# Patient Record
Sex: FEMALE | Race: WHITE | Employment: PART TIME | URBAN - METROPOLITAN AREA
[De-identification: names, ages, dates, MRNs, and addresses within clinical notes are randomized per-mention and may not be internally consistent; named-entity substitution may affect disease eponyms.]

---

## 2017-02-06 ENCOUNTER — OFFICE VISIT (OUTPATIENT)
Dept: FAMILY MEDICINE CLINIC | Age: 37
End: 2017-02-06

## 2017-02-06 VITALS
WEIGHT: 168.6 LBS | HEART RATE: 72 BPM | BODY MASS INDEX: 27.1 KG/M2 | RESPIRATION RATE: 16 BRPM | SYSTOLIC BLOOD PRESSURE: 108 MMHG | TEMPERATURE: 98.7 F | DIASTOLIC BLOOD PRESSURE: 64 MMHG | HEIGHT: 66 IN

## 2017-02-06 DIAGNOSIS — R29.898 WEAKNESS OF RIGHT LOWER EXTREMITY: ICD-10-CM

## 2017-02-06 DIAGNOSIS — G89.29 CHRONIC BILATERAL LOW BACK PAIN WITHOUT SCIATICA: ICD-10-CM

## 2017-02-06 DIAGNOSIS — M54.50 CHRONIC BILATERAL LOW BACK PAIN WITHOUT SCIATICA: ICD-10-CM

## 2017-02-06 DIAGNOSIS — M54.16 LUMBAR RADICULOPATHY: Primary | ICD-10-CM

## 2017-02-06 PROCEDURE — 99213 OFFICE O/P EST LOW 20 MIN: CPT | Performed by: FAMILY MEDICINE

## 2017-02-06 RX ORDER — IBUPROFEN 200 MG
800 TABLET ORAL DAILY PRN
COMMUNITY
End: 2017-03-22 | Stop reason: ALTCHOICE

## 2017-02-06 RX ORDER — CYCLOBENZAPRINE HCL 10 MG
10 TABLET ORAL NIGHTLY PRN
Qty: 30 TABLET | Refills: 0 | Status: SHIPPED | OUTPATIENT
Start: 2017-02-06 | End: 2017-03-22 | Stop reason: SDUPTHER

## 2017-02-06 RX ORDER — IBUPROFEN 800 MG/1
800 TABLET ORAL EVERY 8 HOURS PRN
Qty: 60 TABLET | Refills: 1 | Status: SHIPPED | OUTPATIENT
Start: 2017-02-06

## 2017-02-23 ENCOUNTER — HOSPITAL ENCOUNTER (OUTPATIENT)
Dept: MRI IMAGING | Age: 37
Discharge: HOME | End: 2017-02-23
Payer: COMMERCIAL

## 2017-02-23 VITALS — WEIGHT: 166 LBS | BODY MASS INDEX: 27.2 KG/M2

## 2017-02-23 DIAGNOSIS — R29.898 WEAKNESS OF RIGHT LOWER EXTREMITY: ICD-10-CM

## 2017-02-23 DIAGNOSIS — M54.16 LUMBAR RADICULOPATHY: ICD-10-CM

## 2017-02-23 PROCEDURE — 72148 MRI LUMBAR SPINE W/O DYE: CPT

## 2017-03-22 ENCOUNTER — OFFICE VISIT (OUTPATIENT)
Dept: FAMILY MEDICINE CLINIC | Age: 37
End: 2017-03-22

## 2017-03-22 VITALS
WEIGHT: 170.5 LBS | HEIGHT: 66 IN | TEMPERATURE: 99 F | BODY MASS INDEX: 27.4 KG/M2 | HEART RATE: 76 BPM | DIASTOLIC BLOOD PRESSURE: 62 MMHG | SYSTOLIC BLOOD PRESSURE: 112 MMHG | RESPIRATION RATE: 14 BRPM

## 2017-03-22 DIAGNOSIS — G89.29 CHRONIC BILATERAL LOW BACK PAIN WITHOUT SCIATICA: Primary | ICD-10-CM

## 2017-03-22 DIAGNOSIS — F41.1 GENERALIZED ANXIETY DISORDER: ICD-10-CM

## 2017-03-22 DIAGNOSIS — M47.816 SPONDYLOSIS OF LUMBAR REGION WITHOUT MYELOPATHY OR RADICULOPATHY: ICD-10-CM

## 2017-03-22 DIAGNOSIS — M54.50 CHRONIC BILATERAL LOW BACK PAIN WITHOUT SCIATICA: Primary | ICD-10-CM

## 2017-03-22 PROCEDURE — 99213 OFFICE O/P EST LOW 20 MIN: CPT | Performed by: FAMILY MEDICINE

## 2017-03-22 RX ORDER — CYCLOBENZAPRINE HCL 10 MG
10 TABLET ORAL NIGHTLY PRN
Qty: 30 TABLET | Refills: 0 | Status: SHIPPED | OUTPATIENT
Start: 2017-03-22 | End: 2017-06-28 | Stop reason: SDUPTHER

## 2017-03-22 RX ORDER — CLONAZEPAM 0.5 MG/1
0.5 TABLET ORAL NIGHTLY PRN
Qty: 30 TABLET | Refills: 2 | Status: SHIPPED | OUTPATIENT
Start: 2017-03-22 | End: 2019-07-02

## 2017-03-27 PROBLEM — M47.817 LUMBOSACRAL SPONDYLOSIS WITHOUT MYELOPATHY: Status: ACTIVE | Noted: 2017-03-27

## 2017-03-27 PROBLEM — M51.9 ANNULAR TEAR: Status: ACTIVE | Noted: 2017-03-27

## 2017-04-18 PROBLEM — G89.29 CHRONIC BILATERAL THORACIC BACK PAIN: Status: ACTIVE | Noted: 2017-04-18

## 2017-04-18 PROBLEM — M54.6 CHRONIC BILATERAL THORACIC BACK PAIN: Status: ACTIVE | Noted: 2017-04-18

## 2019-07-02 ENCOUNTER — OFFICE VISIT (OUTPATIENT)
Dept: OBGYN CLINIC | Age: 39
End: 2019-07-02
Payer: COMMERCIAL

## 2019-07-02 VITALS
DIASTOLIC BLOOD PRESSURE: 74 MMHG | HEIGHT: 64 IN | WEIGHT: 182 LBS | BODY MASS INDEX: 31.07 KG/M2 | SYSTOLIC BLOOD PRESSURE: 116 MMHG

## 2019-07-02 DIAGNOSIS — Z11.51 SCREENING FOR HUMAN PAPILLOMAVIRUS: ICD-10-CM

## 2019-07-02 DIAGNOSIS — Z01.419 WOMEN'S ANNUAL ROUTINE GYNECOLOGICAL EXAMINATION: ICD-10-CM

## 2019-07-02 DIAGNOSIS — Z12.31 SCREENING MAMMOGRAM, ENCOUNTER FOR: ICD-10-CM

## 2019-07-02 DIAGNOSIS — Z01.419 WOMEN'S ANNUAL ROUTINE GYNECOLOGICAL EXAMINATION: Primary | ICD-10-CM

## 2019-07-02 PROCEDURE — 99396 PREV VISIT EST AGE 40-64: CPT | Performed by: OBSTETRICS & GYNECOLOGY

## 2019-07-02 RX ORDER — CHLORAL HYDRATE 500 MG
3000 CAPSULE ORAL 3 TIMES DAILY
COMMUNITY

## 2019-07-02 ASSESSMENT — ENCOUNTER SYMPTOMS
ALLERGIC/IMMUNOLOGIC NEGATIVE: 1
RECTAL PAIN: 0
BLOOD IN STOOL: 0
CONSTIPATION: 0
ABDOMINAL PAIN: 0
NAUSEA: 0
ANAL BLEEDING: 0
DIARRHEA: 0
VOMITING: 0
EYES NEGATIVE: 1
ABDOMINAL DISTENTION: 0
RESPIRATORY NEGATIVE: 1

## 2019-07-10 LAB
HPV COMMENT: NORMAL
HPV TYPE 16: NOT DETECTED
HPV TYPE 18: NOT DETECTED
HPVOH (OTHER TYPES): NOT DETECTED

## 2019-07-15 ENCOUNTER — HOSPITAL ENCOUNTER (OUTPATIENT)
Dept: WOMENS IMAGING | Age: 39
Discharge: HOME | End: 2019-07-17
Payer: COMMERCIAL

## 2019-07-15 DIAGNOSIS — Z12.31 SCREENING MAMMOGRAM, ENCOUNTER FOR: ICD-10-CM

## 2019-07-15 PROCEDURE — 77067 SCR MAMMO BI INCL CAD: CPT

## 2023-02-02 PROBLEM — J30.9 CHRONIC ALLERGIC RHINITIS: Status: ACTIVE | Noted: 2023-02-02

## 2023-02-02 PROBLEM — F51.04 CHRONIC INSOMNIA: Status: ACTIVE | Noted: 2023-02-02

## 2023-02-02 PROBLEM — R53.83 FATIGUE: Status: ACTIVE | Noted: 2023-02-02

## 2023-02-02 PROBLEM — F41.1 GENERALIZED ANXIETY DISORDER: Status: ACTIVE | Noted: 2023-02-02

## 2023-02-02 RX ORDER — ESCITALOPRAM OXALATE 10 MG/1
1 TABLET ORAL DAILY
COMMUNITY
Start: 2021-10-20 | End: 2023-03-21 | Stop reason: SDUPTHER

## 2023-02-02 RX ORDER — MELATONIN 5 MG
5 CAPSULE ORAL NIGHTLY
COMMUNITY
End: 2023-03-21 | Stop reason: SDUPTHER

## 2023-02-02 RX ORDER — LORATADINE AND PSEUDOEPHEDRINE SULFATE 5; 120 MG/1; MG/1
1 TABLET, EXTENDED RELEASE ORAL 2 TIMES DAILY
COMMUNITY
End: 2023-06-20 | Stop reason: SDUPTHER

## 2023-02-02 RX ORDER — FLUTICASONE PROPIONATE 50 MCG
2 SPRAY, SUSPENSION (ML) NASAL DAILY
COMMUNITY
Start: 2021-12-02 | End: 2023-06-20 | Stop reason: SDUPTHER

## 2023-02-02 RX ORDER — ALPRAZOLAM 0.5 MG/1
TABLET ORAL
COMMUNITY
Start: 2021-10-20 | End: 2023-03-21 | Stop reason: SDUPTHER

## 2023-03-15 ENCOUNTER — TELEPHONE (OUTPATIENT)
Dept: PRIMARY CARE | Facility: CLINIC | Age: 43
End: 2023-03-15
Payer: COMMERCIAL
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
## 2023-03-16 ENCOUNTER — APPOINTMENT (OUTPATIENT)
Dept: PRIMARY CARE | Facility: CLINIC | Age: 43
End: 2023-03-16
Payer: COMMERCIAL

## 2023-03-21 ENCOUNTER — OFFICE VISIT (OUTPATIENT)
Dept: PRIMARY CARE | Facility: CLINIC | Age: 43
End: 2023-03-21
Payer: COMMERCIAL

## 2023-03-21 VITALS
SYSTOLIC BLOOD PRESSURE: 124 MMHG | HEIGHT: 64 IN | RESPIRATION RATE: 16 BRPM | BODY MASS INDEX: 32.44 KG/M2 | TEMPERATURE: 97.2 F | DIASTOLIC BLOOD PRESSURE: 82 MMHG | WEIGHT: 190 LBS | HEART RATE: 72 BPM

## 2023-03-21 DIAGNOSIS — F41.1 GENERALIZED ANXIETY DISORDER: Primary | ICD-10-CM

## 2023-03-21 DIAGNOSIS — F51.04 CHRONIC INSOMNIA: ICD-10-CM

## 2023-03-21 PROCEDURE — 1036F TOBACCO NON-USER: CPT | Performed by: FAMILY MEDICINE

## 2023-03-21 PROCEDURE — 99213 OFFICE O/P EST LOW 20 MIN: CPT | Performed by: FAMILY MEDICINE

## 2023-03-21 RX ORDER — ALPRAZOLAM 0.5 MG/1
TABLET ORAL
Qty: 60 TABLET | Refills: 0 | Status: SHIPPED | OUTPATIENT
Start: 2023-03-21 | End: 2023-06-20 | Stop reason: SDUPTHER

## 2023-03-21 RX ORDER — MELATONIN 5 MG
5 CAPSULE ORAL NIGHTLY
Qty: 30 CAPSULE | Refills: 2 | Status: SHIPPED | OUTPATIENT
Start: 2023-03-21 | End: 2023-06-20 | Stop reason: SDUPTHER

## 2023-03-21 RX ORDER — ESCITALOPRAM OXALATE 10 MG/1
10 TABLET ORAL DAILY
Qty: 30 TABLET | Refills: 5 | Status: SHIPPED | OUTPATIENT
Start: 2023-03-21 | End: 2023-06-20 | Stop reason: SDUPTHER

## 2023-03-21 ASSESSMENT — PATIENT HEALTH QUESTIONNAIRE - PHQ9
SUM OF ALL RESPONSES TO PHQ9 QUESTIONS 1 AND 2: 0
2. FEELING DOWN, DEPRESSED OR HOPELESS: NOT AT ALL
1. LITTLE INTEREST OR PLEASURE IN DOING THINGS: NOT AT ALL

## 2023-03-21 ASSESSMENT — ANXIETY QUESTIONNAIRES
6. BECOMING EASILY ANNOYED OR IRRITABLE: SEVERAL DAYS
2. NOT BEING ABLE TO STOP OR CONTROL WORRYING: NOT AT ALL
4. TROUBLE RELAXING: NOT AT ALL
GAD7 TOTAL SCORE: 2
5. BEING SO RESTLESS THAT IT IS HARD TO SIT STILL: NOT AT ALL
3. WORRYING TOO MUCH ABOUT DIFFERENT THINGS: NOT AT ALL
IF YOU CHECKED OFF ANY PROBLEMS ON THIS QUESTIONNAIRE, HOW DIFFICULT HAVE THESE PROBLEMS MADE IT FOR YOU TO DO YOUR WORK, TAKE CARE OF THINGS AT HOME, OR GET ALONG WITH OTHER PEOPLE: SOMEWHAT DIFFICULT
1. FEELING NERVOUS, ANXIOUS, OR ON EDGE: SEVERAL DAYS
7. FEELING AFRAID AS IF SOMETHING AWFUL MIGHT HAPPEN: NOT AT ALL

## 2023-03-21 NOTE — PROGRESS NOTES
Jennifer South is a 43 y.o. female who presents for Follow up on Anxiety Disorder.    Anxiety  Presents for follow-up visit. Symptoms include insomnia and nervous/anxious behavior. Patient reports no chest pain, compulsions, confusion, decreased concentration, depressed mood, dizziness, dry mouth, excessive worry, feeling of choking, hyperventilation, impotence, irritability, malaise, muscle tension, nausea, obsessions, palpitations, panic, restlessness, shortness of breath or suicidal ideas. Symptoms occur occasionally. The most recent episode lasted 2 minutes. The severity of symptoms is mild. The patient sleeps 6 hours per night. The quality of sleep is fair. Nighttime awakenings: occasional.     OARRS:  Teja Stevens MD on 3/21/2023  2:27 PM  I have personally reviewed the OARRS report for Jennifer South. I have considered the risks of abuse, dependence, addiction and diversion and I believe that it is clinically appropriate for Jennifer South to be prescribed this medication    Is the patient prescribed a combination of a benzodiazepine and opioid?  No    Last Urine Drug Screen / ordered today: No  Recent Results (from the past 07826 hour(s))   OPIATE/OPIOID/BENZO PRESCRIPTION COMPLIANCE    Collection Time: 10/20/21  1:31 PM   Result Value Ref Range    DRUG SCREEN COMMENT URINE SEE BELOW     Creatine, Urine 91.8 mg/dL    Amphetamine Screen, Urine PRESUMPTIVE NEGATIVE NEGATIVE    Barbiturate Screen, Urine PRESUMPTIVE NEGATIVE NEGATIVE    Cannabinoid Screen, Urine PRESUMPTIVE NEGATIVE NEGATIVE    Cocaine Screen, Urine PRESUMPTIVE NEGATIVE NEGATIVE    PCP Screen, Urine PRESUMPTIVE NEGATIVE NEGATIVE    7-Aminoclonazepam <25 Cutoff <25 ng/mL    Alpha-Hydroxyalprazolam 49 (A) Cutoff <25 ng/mL    Alpha-Hydroxymidazolam <25 Cutoff <25 ng/mL    Alprazolam 51 (A) Cutoff <25 ng/mL    Chlordiazepoxide <25 Cutoff <25 ng/mL    Clonazepam <25 Cutoff <25 ng/mL    Diazepam <25 Cutoff <25 ng/mL    Lorazepam <25 Cutoff  <25 ng/mL    Midazolam <25 Cutoff <25 ng/mL    Nordiazepam <25 Cutoff <25 ng/mL    Oxazepam <25 Cutoff <25 ng/mL    Temazepam <25 Cutoff <25 ng/mL    Zolpidem <25 Cutoff <25 ng/mL    Zolpidem Metabolite (ZCA) <25 Cutoff <25 ng/mL    6-Acetylmorphine <25 Cutoff <25 ng/mL    Codeine <50 Cutoff <50 ng/mL    Hydrocodone <25 Cutoff <25 ng/mL    Hydromorphone <25 Cutoff <25 ng/mL    Morphine Urine <50 Cutoff <50 ng/mL    Norhydrocodone <25 Cutoff <25 ng/mL    Noroxycodone <25 Cutoff <25 ng/mL    Oxycodone <25 Cutoff <25 ng/mL    Oxymorphone <25 Cutoff <25 ng/mL    Tramadol <50 Cutoff <50 ng/mL    O-Desmethyltramadol <50 Cutoff <50 ng/mL    Fentanyl <2.5 Cutoff<2.5 ng/mL    Norfentanyl <2.5 Cutoff<2.5 ng/mL    METHADONE CONFIRMATION,URINE <25 Cutoff <25 ng/mL    EDDP <25 Cutoff <25 ng/mL     Results are as expected.     Controlled Substance Agreement:  Date of the Last Agreement: 2023  Reviewed Controlled Substance Agreement including but not limited to the benefits, risks, and alternatives to treatment with a Controlled Substance medication(s).    Benzodiazepines:  What is the patient's goal of therapy? Able to function without being unduly anxious and carry out day to day activities without tension and panic attacks.    Is this being achieved with current treatment? Yes    NITA-7:  Over the last 2 weeks, how often have you been bothered by any of the following problems?  Feeling nervous, anxious, or on edge: 1  Not being able to stop or control worryin  Worrying too much about different things: 0  Trouble relaxin  Being so restless that it is hard to sit still: 0  Becoming easily annoyed or irritable: 1  Feeling afraid as if something awful might happen: 0  NITA-7 Total Score: 2      Activities of Daily Living:   Is your overall impression that this patient is benefiting (symptom reduction outweighs side effects) from benzodiazepine therapy? Yes     1. Physical Functioning: Same  2. Family Relationship:  Same  3. Social Relationship: Same  4. Mood: Same  5. Sleep Patterns: Same  6. Overall Function: Same    Past Medical History:   Diagnosis Date    Other seasonal allergic rhinitis     Seasonal allergies     Patient Active Problem List    Diagnosis Date Noted    Chronic allergic rhinitis 02/02/2023    Fatigue 02/02/2023    Generalized anxiety disorder 02/02/2023    Chronic insomnia 02/02/2023     History reviewed. No pertinent surgical history.    Social History     Socioeconomic History    Marital status:      Spouse name: None    Number of children: None    Years of education: None    Highest education level: None   Occupational History    None   Tobacco Use    Smoking status: Never    Smokeless tobacco: Never   Vaping Use    Vaping status: None   Substance and Sexual Activity    Alcohol use: None    Drug use: None    Sexual activity: None   Other Topics Concern    None   Social History Narrative    None     Social Determinants of Health     Financial Resource Strain: Not on file   Food Insecurity: Not on file   Transportation Needs: Not on file   Physical Activity: Not on file   Stress: Not on file   Social Connections: Not on file   Intimate Partner Violence: Not on file   Housing Stability: Not on file     Current Outpatient Medications   Medication Sig Dispense Refill    fluticasone (Flonase) 50 mcg/actuation nasal spray Administer 2 sprays into affected nostril(s) once daily.      loratadine-pseudoephedrine (Claritin-D 12 Hour) 5-120 mg 12 hr tablet Take 1 tablet by mouth in the morning and 1 tablet before bedtime.      ALPRAZolam (Xanax) 0.5 mg tablet TAKE 1/2 TO 1 TABLET TWICE DAILY AS NEEDED. 60 tablet 0    escitalopram (Lexapro) 10 mg tablet Take 1 tablet (10 mg) by mouth once daily. 30 tablet 5    melatonin 5 mg capsule Take 5 mg by mouth once daily at bedtime. 30 capsule 2     No current facility-administered medications for this visit.     Current Outpatient Medications on File Prior to Visit  "  Medication Sig Dispense Refill    fluticasone (Flonase) 50 mcg/actuation nasal spray Administer 2 sprays into affected nostril(s) once daily.      loratadine-pseudoephedrine (Claritin-D 12 Hour) 5-120 mg 12 hr tablet Take 1 tablet by mouth in the morning and 1 tablet before bedtime.      [DISCONTINUED] ALPRAZolam (Xanax) 0.5 mg tablet TAKE 1/2 TO 1 TABLET TWICE DAILY AS NEEDED.      [DISCONTINUED] escitalopram (Lexapro) 10 mg tablet Take 1 tablet (10 mg) by mouth once daily.      [DISCONTINUED] melatonin 5 mg capsule Take 5 mg by mouth at bedtime.       No current facility-administered medications on file prior to visit.     No Known Allergies    Review of Systems - General ROS: negative for - fatigue, fever, malaise, night sweats, sleep disturbance or weight loss  ENT ROS: negative for - hearing change, nasal discharge, oral lesions, sinus pain, sore throat, tinnitus or vertigo  Endocrine ROS: negative for - hot flashes, malaise/lethargy, palpitations, polydipsia/polyuria'  Respiratory ROS: negative for - cough, hemoptysis, pleuritic pain, shortness of breath or wheezing  Cardiovascular ROS: no chest pain or dyspnea on exertion  Gastrointestinal ROS: no abdominal pain, change in bowel habits, or black or bloody stools  Genito-Urinary ROS: no dysuria, trouble voiding, or hematuria  Neurological ROS: negative for - dizziness, gait disturbance, headaches, impaired coordination/balance,   No numbness/tingling, tremors or visual changes    Blood pressure 124/82, pulse 72, temperature 36.2 °C (97.2 °F), resp. rate 16, height 1.626 m (5' 4\"), weight 86.2 kg (190 lb).    Physical Examination: General appearance - alert, well appearing, and in no distress  Mental status - alert, oriented to person, place, and time  Eyes - pupils equal and reactive, extraocular eye movements intact  Ears - bilateral TM's and external ear canals normal  Mouth - mucous membranes moist, pharynx normal without lesions  Neck - supple, no " significant adenopathy  Chest - clear to auscultation, no wheezes, rales or rhonchi, symmetric air entry  Heart - normal rate, regular rhythm, normal S1, S2, no murmurs, rubs, clicks or gallops  Abdomen - soft, nontender, nondistended, no masses or organomegaly  Extremities -no cyanosis clubbing or edema  Neurological - alert, oriented, normal speech, no focal findings or movement disorder noted    Problem List Items Addressed This Visit       Chronic insomnia    Relevant Medications    melatonin 5 mg capsule    Other Relevant Orders    Follow Up In [facility] - PCP    Generalized anxiety disorder - Primary    Relevant Medications    ALPRAZolam (Xanax) 0.5 mg tablet    escitalopram (Lexapro) 10 mg tablet    Other Relevant Orders    Follow Up In [facility] - PCP     Scribe Attestation  By signing my name below, I, Shady Holbrook, Scribe   attest that this documentation has been prepared under the direction and in the presence of Teja Stevens MD.

## 2023-06-20 ENCOUNTER — OFFICE VISIT (OUTPATIENT)
Dept: PRIMARY CARE | Facility: CLINIC | Age: 43
End: 2023-06-20
Payer: COMMERCIAL

## 2023-06-20 VITALS
SYSTOLIC BLOOD PRESSURE: 126 MMHG | RESPIRATION RATE: 18 BRPM | HEIGHT: 64 IN | WEIGHT: 197.8 LBS | OXYGEN SATURATION: 98 % | DIASTOLIC BLOOD PRESSURE: 64 MMHG | BODY MASS INDEX: 33.77 KG/M2 | HEART RATE: 85 BPM | TEMPERATURE: 97.8 F

## 2023-06-20 DIAGNOSIS — F41.1 GENERALIZED ANXIETY DISORDER: ICD-10-CM

## 2023-06-20 DIAGNOSIS — F51.04 CHRONIC INSOMNIA: ICD-10-CM

## 2023-06-20 DIAGNOSIS — J30.9 CHRONIC ALLERGIC RHINITIS: Primary | ICD-10-CM

## 2023-06-20 PROCEDURE — 1036F TOBACCO NON-USER: CPT | Performed by: FAMILY MEDICINE

## 2023-06-20 PROCEDURE — 99214 OFFICE O/P EST MOD 30 MIN: CPT | Performed by: FAMILY MEDICINE

## 2023-06-20 RX ORDER — MELATONIN 5 MG
5 CAPSULE ORAL NIGHTLY
Qty: 30 CAPSULE | Refills: 2 | Status: SHIPPED | OUTPATIENT
Start: 2023-06-20 | End: 2023-10-31 | Stop reason: SDUPTHER

## 2023-06-20 RX ORDER — FLUTICASONE PROPIONATE 50 MCG
2 SPRAY, SUSPENSION (ML) NASAL DAILY
Qty: 48 G | Refills: 1 | Status: SHIPPED | OUTPATIENT
Start: 2023-06-20 | End: 2023-10-31 | Stop reason: SDUPTHER

## 2023-06-20 RX ORDER — LORATADINE AND PSEUDOEPHEDRINE SULFATE 5; 120 MG/1; MG/1
1 TABLET, EXTENDED RELEASE ORAL 2 TIMES DAILY
Qty: 60 TABLET | Refills: 2 | Status: SHIPPED | OUTPATIENT
Start: 2023-06-20 | End: 2023-10-31 | Stop reason: SDUPTHER

## 2023-06-20 RX ORDER — ALPRAZOLAM 0.5 MG/1
TABLET ORAL
Qty: 60 TABLET | Refills: 0 | Status: SHIPPED | OUTPATIENT
Start: 2023-06-20 | End: 2023-10-31 | Stop reason: SDUPTHER

## 2023-06-20 RX ORDER — ESCITALOPRAM OXALATE 10 MG/1
10 TABLET ORAL DAILY
Qty: 30 TABLET | Refills: 5 | Status: SHIPPED | OUTPATIENT
Start: 2023-06-20 | End: 2023-10-31 | Stop reason: SDUPTHER

## 2023-06-20 ASSESSMENT — ANXIETY QUESTIONNAIRES
6. BECOMING EASILY ANNOYED OR IRRITABLE: SEVERAL DAYS
2. NOT BEING ABLE TO STOP OR CONTROL WORRYING: SEVERAL DAYS
7. FEELING AFRAID AS IF SOMETHING AWFUL MIGHT HAPPEN: NOT AT ALL
5. BEING SO RESTLESS THAT IT IS HARD TO SIT STILL: NOT AT ALL
4. TROUBLE RELAXING: SEVERAL DAYS
3. WORRYING TOO MUCH ABOUT DIFFERENT THINGS: SEVERAL DAYS
1. FEELING NERVOUS, ANXIOUS, OR ON EDGE: SEVERAL DAYS
IF YOU CHECKED OFF ANY PROBLEMS ON THIS QUESTIONNAIRE, HOW DIFFICULT HAVE THESE PROBLEMS MADE IT FOR YOU TO DO YOUR WORK, TAKE CARE OF THINGS AT HOME, OR GET ALONG WITH OTHER PEOPLE: NOT DIFFICULT AT ALL
GAD7 TOTAL SCORE: 5

## 2023-06-20 ASSESSMENT — PATIENT HEALTH QUESTIONNAIRE - PHQ9
2. FEELING DOWN, DEPRESSED OR HOPELESS: NOT AT ALL
SUM OF ALL RESPONSES TO PHQ9 QUESTIONS 1 AND 2: 0
1. LITTLE INTEREST OR PLEASURE IN DOING THINGS: NOT AT ALL

## 2023-06-20 NOTE — ASSESSMENT & PLAN NOTE
Handouts describing disease, natural history, and treatment were given to the patient.  Reviewed concept of anxiety as biochemical imbalance of neurotransmitters and rationale for treatment.  Instructed patient to contact office or on-call physician promptly should condition worsen or any new symptoms appear and provided on-call telephone numbers. IF THE PATIENT HAS ANY SUICIDAL OR HOMICIDAL IDEATIONS, CALL THE OFFICE, DISCUSS WITH A SUPPORT MEMBER, OR GO TO THE ER IMMEDIATELY. Patient was agreeable with this plan.

## 2023-06-20 NOTE — PROGRESS NOTES
Chief Complaint   Patient presents with    Follow-up     Anxiety and Insomnia     Patient presents today to follow up on anxiety disorder. Current symptoms: insomnia, irritable. Current treatment includes: Lexapro and Xanax She denies current suicidal and homicidal ideation. She complains of the following side effects from the treatment: none.     Patient also presents today to follow up on insomnia. Patient describes symptoms as frequent night time awakening. Patient has found moderate relief with melatonin use. Associated symptoms include: anxiety, frequent nighttime urination, and irritability. Patient denies daytime somnolence, depression, leg cramps, restless legs, snoring, and stress. Symptoms have been intermittent.    Past Medical History:   Diagnosis Date    Other seasonal allergic rhinitis     Seasonal allergies     Patient Active Problem List    Diagnosis Date Noted    Chronic allergic rhinitis 02/02/2023    Fatigue 02/02/2023    Generalized anxiety disorder 02/02/2023    Chronic insomnia 02/02/2023     History reviewed. No pertinent surgical history.    Social History     Socioeconomic History    Marital status:      Spouse name: None    Number of children: None    Years of education: None    Highest education level: None   Occupational History    None   Tobacco Use    Smoking status: Never    Smokeless tobacco: Never   Vaping Use    Vaping Use: Never used   Substance and Sexual Activity    Alcohol use: Not Currently    Drug use: Never    Sexual activity: Defer   Other Topics Concern    None   Social History Narrative    None     Social Determinants of Health     Financial Resource Strain: Not on file   Food Insecurity: Not on file   Transportation Needs: Not on file   Physical Activity: Not on file   Stress: Not on file   Social Connections: Not on file   Intimate Partner Violence: Not on file   Housing Stability: Not on file     Current Outpatient Medications   Medication Sig Dispense Refill     ALPRAZolam (Xanax) 0.5 mg tablet TAKE 1/2 TO 1 TABLET TWICE DAILY AS NEEDED. 60 tablet 0    escitalopram (Lexapro) 10 mg tablet Take 1 tablet (10 mg) by mouth once daily. 30 tablet 5    fluticasone (Flonase) 50 mcg/actuation nasal spray Administer 2 sprays into each nostril once daily. 48 g 1    loratadine-pseudoephedrine (Claritin-D 12 Hour) 5-120 mg 12 hr tablet Take 1 tablet by mouth 2 times a day. 60 tablet 2    melatonin 5 mg capsule Take 5 mg by mouth once daily at bedtime. 30 capsule 2     No current facility-administered medications for this visit.     Current Outpatient Medications on File Prior to Visit   Medication Sig Dispense Refill    [DISCONTINUED] ALPRAZolam (Xanax) 0.5 mg tablet TAKE 1/2 TO 1 TABLET TWICE DAILY AS NEEDED. 60 tablet 0    [DISCONTINUED] escitalopram (Lexapro) 10 mg tablet Take 1 tablet (10 mg) by mouth once daily. 30 tablet 5    [DISCONTINUED] fluticasone (Flonase) 50 mcg/actuation nasal spray Administer 2 sprays into affected nostril(s) once daily.      [DISCONTINUED] loratadine-pseudoephedrine (Claritin-D 12 Hour) 5-120 mg 12 hr tablet Take 1 tablet by mouth 2 times a day.      [DISCONTINUED] melatonin 5 mg capsule Take 5 mg by mouth once daily at bedtime. 30 capsule 2     No current facility-administered medications on file prior to visit.     No Known Allergies    Review of Systems - General ROS: negative for - fatigue, fever, malaise, night sweats or weight loss  ENT ROS: negative for - hearing change, nasal discharge, oral lesions, sinus pain, sore throat, tinnitus or vertigo  Allergy and Immunology ROS: negative for - hives, nasal congestion or seasonal allergies  Respiratory ROS: negative for - cough, hemoptysis, pleuritic pain, shortness of breath or wheezing  Cardiovascular ROS: no chest pain or dyspnea on exertion, palpitations, PND or orthopnea.  No syncope.  Gastrointestinal ROS: no abdominal pain, change in bowel habits, or black or bloody stools  Genito-Urinary ROS: no  "dysuria, trouble voiding, or hematuria  Dermatological ROS: negative for - dry skin, lumps, pruritus or rash  Musculoskeletal ROS: negative for - joint pain, joint stiffness, joint swelling, muscle pain or muscular weakness  Neurological ROS: negative for - dizziness, gait disturbance, headaches, impaired coordination/balance, numbness/tingling, tremors or visual changes  Endocrine ROS: negative for - hot flashes, malaise/lethargy, palpitations, polydipsia/polyuria, skin changes, temperature intolerance   Hematological and Lymphatic ROS: negative for - bruising, night sweats or pallor    Blood pressure 126/64, pulse 85, temperature 36.6 °C (97.8 °F), resp. rate 18, height 1.626 m (5' 4\"), weight 89.7 kg (197 lb 12.8 oz), SpO2 98 %.    Physical Examination: General appearance - alert, well appearing, and in no distress  HEENT EOMI, PEERLA, normal eyelids.  Oropharynx no erythema or exudate.  Normal tonsils.    Ears -external auditory canal normal bilaterally.  Tympanic membranes normal bilaterally.  Mouth - mucous membranes moist, pharynx normal without lesions  Neck - supple, trachea central no thyromegaly.  No significant adenopathy  Chest -good air entry bilaterally, no rhonchi rales and no wheezes.  Heart -normal S1 and S2, regular rate and rhythm with no murmurs gallop or rub.  Abdomen -nondistended, soft, nontender with no guarding, no palpable mass and no CVA tenderness.  Bowel sounds normal.  No hernia.  Neurological - alert, oriented, cranial nerves II through XII normal.  Reflexes 2+ bilaterally.  No focal deficit.  Musculoskeletal - no joint tenderness, deformity or swelling  Extremities: peripheral pulses normal, no clubbing or cyanosis.    Legacy Encounter on 11/30/2021   Component Date Value Ref Range Status    Glucose 11/30/2021 86  74 - 99 mg/dL Final    Sodium 11/30/2021 134 (L)  136 - 145 mmol/L Final    Potassium 11/30/2021 4.5  3.5 - 5.3 mmol/L Final    Chloride 11/30/2021 101  98 - 107 mmol/L " Final    Bicarbonate 11/30/2021 25  21 - 32 mmol/L Final    Anion Gap 11/30/2021 13  10 - 20 mmol/L Final    Urea Nitrogen 11/30/2021 13  6 - 23 mg/dL Final    Creatinine 11/30/2021 0.69  0.50 - 1.05 mg/dL Final    GLOMERULAR FILTRATION RATE-NON AFR* 11/30/2021 >60  >60 mL/min/1.73m2 Final    GLOMERULAR FILTRATION RATE-* 11/30/2021 >60  >60 mL/min/1.73m2 Final    Comment:  CALCULATIONS OF ESTIMATED GFR ARE PERFORMED   USING THE MDRD STUDY EQUATION FOR THE   IDMS-TRACEABLE CREATININE METHODS.   CLIN CHEM 2007;53:766-72      Calcium 11/30/2021 9.4  8.6 - 10.3 mg/dL Final    Albumin 11/30/2021 4.3  3.4 - 5.0 g/dL Final    Alkaline Phosphatase 11/30/2021 48  33 - 110 U/L Final    Total Protein 11/30/2021 7.5  6.4 - 8.2 g/dL Final    AST 11/30/2021 16  9 - 39 U/L Final    Total Bilirubin 11/30/2021 0.5  0.0 - 1.2 mg/dL Final    ALT (SGPT) 11/30/2021 12  7 - 45 U/L Final    Comment:  Patients treated with Sulfasalazine may generate    falsely decreased results for ALT.      TSH 11/30/2021 1.27  0.44 - 3.98 mIU/L Final    Comment:  TSH testing is performed using different testing    methodology at [facility] than at other    [facility] hospitals. Direct result comparisons should    only be made within the same method.      WBC 11/30/2021 8.6  4.4 - 11.3 x10E9/L Final    RBC 11/30/2021 4.17  4.00 - 5.20 x10E12/L Final    Hemoglobin 11/30/2021 13.2  12.0 - 16.0 g/dL Final    Hematocrit 11/30/2021 40.8  36.0 - 46.0 % Final    MCV 11/30/2021 98  80 - 100 fL Final    MCHC 11/30/2021 32.4  32.0 - 36.0 g/dL Final    Platelets 11/30/2021 319  150 - 450 x10E9/L Final    RDW 11/30/2021 12.8  11.5 - 14.5 % Final    Neutrophils % 11/30/2021 48.8  40.0 - 80.0 % Final    Immature Granulocytes %, Automated 11/30/2021 0.1  0.0 - 0.9 % Final    Comment:  Immature Granulocyte Count (IG) includes promyelocytes,    myelocytes and metamyelocytes but does not include bands.   Percent differential counts (%) should be  interpreted in the   context of the absolute cell counts (cells/L).      Lymphocytes % 11/30/2021 37.8  13.0 - 44.0 % Final    Monocytes % 11/30/2021 10.1  2.0 - 10.0 % Final    Eosinophils % 11/30/2021 2.5  0.0 - 6.0 % Final    Basophils % 11/30/2021 0.7  0.0 - 2.0 % Final    Neutrophils Absolute 11/30/2021 4.21  1.20 - 7.70 x10E9/L Final    Lymphocytes Absolute 11/30/2021 3.27  1.20 - 4.80 x10E9/L Final    Monocytes Absolute 11/30/2021 0.87  0.10 - 1.00 x10E9/L Final    Eosinophils Absolute 11/30/2021 0.22  0.00 - 0.70 x10E9/L Final    Basophils Absolute 11/30/2021 0.06  0.00 - 0.10 x10E9/L Final    Cholesterol 11/30/2021 234 (H)  0 - 199 mg/dL Final    Comment: .      AGE      DESIRABLE   BORDERLINE HIGH   HIGH     0-19 Y     0 - 169       170 - 199     >/= 200    20-24 Y     0 - 189       190 - 224     >/= 225         >24 Y     0 - 199       200 - 239     >/= 240   **All ranges are based on fasting samples. Specific   therapeutic targets will vary based on patient-specific   cardiac risk.  .   Pediatric guidelines reference:Pediatrics 2011, 128(S5).   Adult guidelines reference: NCEP ATPIII Guidelines,     KERI 2001, 258:2486-97  .   Venipuncture immediately after or during the    administration of Metamizole may lead to falsely   low results. Testing should be performed immediately   prior to Metamizole dosing.      HDL 11/30/2021 95.0  mg/dL Final    Comment: .      AGE      VERY LOW   LOW     NORMAL    HIGH       0-19 Y       < 35   < 40     40-45     ----    20-24 Y       ----   < 40       >45     ----      >24 Y       ----   < 40     40-60      >60  .      Cholesterol/HDL Ratio 11/30/2021 2.5   Final    Comment: REF VALUES  DESIRABLE  < 3.4  HIGH RISK  > 5.0      LDL 11/30/2021 122 (H)  0 - 99 mg/dL Final    Comment: .                           NEAR      BORD      AGE      DESIRABLE  OPTIMAL    HIGH     HIGH     VERY HIGH     0-19 Y     0 - 109     ---    110-129   >/= 130     ----    20-24 Y     0 - 119      ---    120-159   >/= 160     ----      >24 Y     0 -  99   100-129  130-159   160-189     >/=190  .      VLDL 11/30/2021 17  0 - 40 mg/dL Final    Triglycerides 11/30/2021 85  0 - 149 mg/dL Final    Comment: .      AGE      DESIRABLE   BORDERLINE HIGH   HIGH     VERY HIGH   0 D-90 D    19 - 174         ----         ----        ----  91 D- 9 Y     0 -  74        75 -  99     >/= 100      ----    10-19 Y     0 -  89        90 - 129     >/= 130      ----    20-24 Y     0 - 114       115 - 149     >/= 150      ----         >24 Y     0 - 149       150 - 199    200- 499    >/= 500  .   Venipuncture immediately after or during the    administration of Metamizole may lead to falsely   low results. Testing should be performed immediately   prior to Metamizole dosing.         Problem List Items Addressed This Visit       Chronic allergic rhinitis - Primary    Relevant Medications    fluticasone (Flonase) 50 mcg/actuation nasal spray    loratadine-pseudoephedrine (Claritin-D 12 Hour) 5-120 mg 12 hr tablet    Chronic insomnia    Relevant Medications    melatonin 5 mg capsule    Other Relevant Orders    Follow Up In [facility] - PCP    Generalized anxiety disorder    Current Assessment & Plan       Handouts describing disease, natural history, and treatment were given to the patient.  Reviewed concept of anxiety as biochemical imbalance of neurotransmitters and rationale for treatment.  Instructed patient to contact office or on-call physician promptly should condition worsen or any new symptoms appear and provided on-call telephone numbers. IF THE PATIENT HAS ANY SUICIDAL OR HOMICIDAL IDEATIONS, CALL THE OFFICE, DISCUSS WITH A SUPPORT MEMBER, OR GO TO THE ER IMMEDIATELY. Patient was agreeable with this plan.           Relevant Medications    ALPRAZolam (Xanax) 0.5 mg tablet    escitalopram (Lexapro) 10 mg tablet    Other Relevant Orders    Follow Up In [facility] - PCP     OARRS:  Teja Stevens MD on  6/20/2023  4:17 PM  I have personally reviewed the OARRS report for Jennifer PANIAGUA Brannon. I have considered the risks of abuse, dependence, addiction and diversion    Is the patient prescribed a combination of a benzodiazepine and opioid?  No    Last Urine Drug Screen Done 12/14/2022  Recent Results (from the past 64916 hour(s))   OPIATE/OPIOID/BENZO PRESCRIPTION COMPLIANCE    Collection Time: 10/20/21  1:31 PM   Result Value Ref Range    DRUG SCREEN COMMENT URINE SEE BELOW     Creatine, Urine 91.8 mg/dL    Amphetamine Screen, Urine PRESUMPTIVE NEGATIVE NEGATIVE    Barbiturate Screen, Urine PRESUMPTIVE NEGATIVE NEGATIVE    Cannabinoid Screen, Urine PRESUMPTIVE NEGATIVE NEGATIVE    Cocaine Screen, Urine PRESUMPTIVE NEGATIVE NEGATIVE    PCP Screen, Urine PRESUMPTIVE NEGATIVE NEGATIVE    7-Aminoclonazepam <25 Cutoff <25 ng/mL    Alpha-Hydroxyalprazolam 49 (A) Cutoff <25 ng/mL    Alpha-Hydroxymidazolam <25 Cutoff <25 ng/mL    Alprazolam 51 (A) Cutoff <25 ng/mL    Chlordiazepoxide <25 Cutoff <25 ng/mL    Clonazepam <25 Cutoff <25 ng/mL    Diazepam <25 Cutoff <25 ng/mL    Lorazepam <25 Cutoff <25 ng/mL    Midazolam <25 Cutoff <25 ng/mL    Nordiazepam <25 Cutoff <25 ng/mL    Oxazepam <25 Cutoff <25 ng/mL    Temazepam <25 Cutoff <25 ng/mL    Zolpidem <25 Cutoff <25 ng/mL    Zolpidem Metabolite (ZCA) <25 Cutoff <25 ng/mL    6-Acetylmorphine <25 Cutoff <25 ng/mL    Codeine <50 Cutoff <50 ng/mL    Hydrocodone <25 Cutoff <25 ng/mL    Hydromorphone <25 Cutoff <25 ng/mL    Morphine Urine <50 Cutoff <50 ng/mL    Norhydrocodone <25 Cutoff <25 ng/mL    Noroxycodone <25 Cutoff <25 ng/mL    Oxycodone <25 Cutoff <25 ng/mL    Oxymorphone <25 Cutoff <25 ng/mL    Tramadol <50 Cutoff <50 ng/mL    O-Desmethyltramadol <50 Cutoff <50 ng/mL    Fentanyl <2.5 Cutoff<2.5 ng/mL    Norfentanyl <2.5 Cutoff<2.5 ng/mL    METHADONE CONFIRMATION,URINE <25 Cutoff <25 ng/mL    EDDP <25 Cutoff <25 ng/mL     Controlled Substance Agreement:  Date of the Last  Agreement: 3/21/2023  Reviewed Controlled Substance Agreement including but not limited to the benefits, risks, and alternatives to treatment with a Controlled Substance medication(s).    Benzodiazepines:  What is the patient's goal of therapy? Able to function without being unduly anxious and carry out day-to-day activities without tension and panic attacks    Is this being achieved with current treatment? yes    NITA-7:  Over the last 2 weeks, how often have you been bothered by any of the following problems?  Feeling nervous, anxious, or on edge: 1  Not being able to stop or control worryin  Worrying too much about different things: 1  Trouble relaxin  Being so restless that it is hard to sit still: 0  Becoming easily annoyed or irritable: 1  Feeling afraid as if something awful might happen: 0  NITA-7 Total Score: 5    Activities of Daily Living:   Is your overall impression that this patient is benefiting (symptom reduction outweighs side effects) from benzodiazepine therapy? Yes     1. Physical Functioning: Better  2. Family Relationship: Better  3. Social Relationship: Better  4. Mood: Better  5. Sleep Patterns: Better  6. Overall Function: Better    Scribe Attestation  By signing my name below, IShady Scribe   attest that this documentation has been prepared under the direction and in the presence of Teja Stevens MD.

## 2023-10-31 ENCOUNTER — OFFICE VISIT (OUTPATIENT)
Dept: PRIMARY CARE | Facility: CLINIC | Age: 43
End: 2023-10-31
Payer: COMMERCIAL

## 2023-10-31 VITALS
HEIGHT: 64 IN | BODY MASS INDEX: 34.62 KG/M2 | RESPIRATION RATE: 21 BRPM | WEIGHT: 202.8 LBS | OXYGEN SATURATION: 98 % | HEART RATE: 78 BPM | DIASTOLIC BLOOD PRESSURE: 92 MMHG | SYSTOLIC BLOOD PRESSURE: 128 MMHG | TEMPERATURE: 97.5 F

## 2023-10-31 DIAGNOSIS — G43.719 INTRACTABLE CHRONIC MIGRAINE WITHOUT AURA AND WITHOUT STATUS MIGRAINOSUS: Primary | ICD-10-CM

## 2023-10-31 DIAGNOSIS — J30.9 CHRONIC ALLERGIC RHINITIS: ICD-10-CM

## 2023-10-31 DIAGNOSIS — F41.1 GENERALIZED ANXIETY DISORDER: ICD-10-CM

## 2023-10-31 DIAGNOSIS — F51.04 CHRONIC INSOMNIA: ICD-10-CM

## 2023-10-31 DIAGNOSIS — R11.0 NAUSEA: ICD-10-CM

## 2023-10-31 PROCEDURE — 1036F TOBACCO NON-USER: CPT | Performed by: FAMILY MEDICINE

## 2023-10-31 PROCEDURE — 99214 OFFICE O/P EST MOD 30 MIN: CPT | Performed by: FAMILY MEDICINE

## 2023-10-31 RX ORDER — ESCITALOPRAM OXALATE 10 MG/1
10 TABLET ORAL DAILY
Qty: 30 TABLET | Refills: 5 | Status: SHIPPED | OUTPATIENT
Start: 2023-10-31 | End: 2024-05-10 | Stop reason: SDUPTHER

## 2023-10-31 RX ORDER — LORATADINE AND PSEUDOEPHEDRINE SULFATE 5; 120 MG/1; MG/1
1 TABLET, EXTENDED RELEASE ORAL 2 TIMES DAILY
Qty: 60 TABLET | Refills: 2 | Status: SHIPPED | OUTPATIENT
Start: 2023-10-31 | End: 2024-05-10 | Stop reason: SDUPTHER

## 2023-10-31 RX ORDER — SUMATRIPTAN SUCCINATE 100 MG/1
100 TABLET ORAL ONCE AS NEEDED
Qty: 9 TABLET | Refills: 2 | Status: SHIPPED | OUTPATIENT
Start: 2023-10-31

## 2023-10-31 RX ORDER — FLUTICASONE PROPIONATE 50 MCG
2 SPRAY, SUSPENSION (ML) NASAL DAILY
Qty: 48 G | Refills: 1 | Status: SHIPPED | OUTPATIENT
Start: 2023-10-31 | End: 2024-05-10 | Stop reason: SDUPTHER

## 2023-10-31 RX ORDER — ONDANSETRON 4 MG/1
4 TABLET, FILM COATED ORAL EVERY 8 HOURS PRN
Qty: 20 TABLET | Refills: 0 | Status: SHIPPED | OUTPATIENT
Start: 2023-10-31

## 2023-10-31 RX ORDER — LANOLIN ALCOHOL/MO/W.PET/CERES
400 CREAM (GRAM) TOPICAL DAILY
Qty: 30 TABLET | Refills: 3 | Status: SHIPPED | OUTPATIENT
Start: 2023-10-31 | End: 2024-01-30 | Stop reason: SDUPTHER

## 2023-10-31 RX ORDER — ALPRAZOLAM 0.5 MG/1
.25-.5 TABLET ORAL 2 TIMES DAILY PRN
Qty: 60 TABLET | Refills: 0 | Status: SHIPPED | OUTPATIENT
Start: 2023-10-31 | End: 2024-01-30 | Stop reason: SDUPTHER

## 2023-10-31 RX ORDER — MELATONIN 5 MG
5 CAPSULE ORAL NIGHTLY
Qty: 30 CAPSULE | Refills: 2 | Status: SHIPPED | OUTPATIENT
Start: 2023-10-31 | End: 2024-01-30 | Stop reason: SDUPTHER

## 2023-10-31 ASSESSMENT — ANXIETY QUESTIONNAIRES
GAD7 TOTAL SCORE: 13
4. TROUBLE RELAXING: NEARLY EVERY DAY
2. NOT BEING ABLE TO STOP OR CONTROL WORRYING: MORE THAN HALF THE DAYS
1. FEELING NERVOUS, ANXIOUS, OR ON EDGE: NEARLY EVERY DAY
3. WORRYING TOO MUCH ABOUT DIFFERENT THINGS: MORE THAN HALF THE DAYS
5. BEING SO RESTLESS THAT IT IS HARD TO SIT STILL: NEARLY EVERY DAY
7. FEELING AFRAID AS IF SOMETHING AWFUL MIGHT HAPPEN: NOT AT ALL
IF YOU CHECKED OFF ANY PROBLEMS ON THIS QUESTIONNAIRE, HOW DIFFICULT HAVE THESE PROBLEMS MADE IT FOR YOU TO DO YOUR WORK, TAKE CARE OF THINGS AT HOME, OR GET ALONG WITH OTHER PEOPLE: SOMEWHAT DIFFICULT
6. BECOMING EASILY ANNOYED OR IRRITABLE: NOT AT ALL

## 2023-10-31 ASSESSMENT — ENCOUNTER SYMPTOMS
JOINT SWELLING: 0
ANOREXIA: 0
EYE PAIN: 0
LOSS OF BALANCE: 0
INSOMNIA: 1
SCALP TENDERNESS: 0
NUMBNESS: 0
SINUS PRESSURE: 1
FATIGUE: 0
SHORTNESS OF BREATH: 0
IRRITABILITY: 0
SWOLLEN GLANDS: 0
THOUGHT CONTENT - OBSESSIONS: 0
PALPITATIONS: 0
VISUAL CHANGE: 0
DEPRESSED MOOD: 1
HEADACHES: 1
ARTHRALGIAS: 0
SORE THROAT: 1
ABDOMINAL PAIN: 0
PHOTOPHOBIA: 1
VOMITING: 1
NERVOUS/ANXIOUS: 1
RESTLESSNESS: 1
NAUSEA: 1
DIZZINESS: 0
BLURRED VISION: 0
RHINORRHEA: 1
CONFUSION: 0

## 2023-10-31 ASSESSMENT — PATIENT HEALTH QUESTIONNAIRE - PHQ9
1. LITTLE INTEREST OR PLEASURE IN DOING THINGS: NOT AT ALL
2. FEELING DOWN, DEPRESSED OR HOPELESS: NOT AT ALL
SUM OF ALL RESPONSES TO PHQ9 QUESTIONS 1 AND 2: 0

## 2023-10-31 NOTE — PROGRESS NOTES
Chief Complaint   Patient presents with    Follow-up     Anxiety and Insomnia     Headache       Subjective   Patient ID: Jennifer South is a 43 y.o. female who presents for Follow-up (Anxiety and Insomnia ) and Headache.    Anxiety  Presents for follow-up visit. Symptoms include depressed mood, excessive worry, insomnia, nausea, nervous/anxious behavior and restlessness. Patient reports no chest pain, confusion, dizziness, dry mouth, irritability, obsessions, palpitations, shortness of breath or suicidal ideas. Nighttime awakenings: occasional.     Compliance with medications is %.   Insomnia  This is a chronic problem. The current episode started more than 1 year ago. Associated symptoms include headaches, nausea, a sore throat and vomiting. Pertinent negatives include no abdominal pain, anorexia, arthralgias, chest pain, fatigue, joint swelling, numbness, rash, swollen glands or visual change. Nothing aggravates the symptoms.   Migraine   This is a new problem. The current episode started more than 1 year ago. The problem occurs intermittently. The problem has been gradually worsening. The pain is located in the Frontal and occipital region. The pain does not radiate. The pain is at a severity of 9/10. The pain is severe. Associated symptoms include drainage, insomnia, nausea, phonophobia, photophobia, rhinorrhea, sinus pressure, a sore throat and vomiting. Pertinent negatives include no abdominal pain, anorexia, blurred vision, dizziness, ear pain, eye pain, loss of balance, numbness, scalp tenderness, swollen glands or visual change. The symptoms are aggravated by weather changes. She has tried cold packs for the symptoms. The treatment provided no relief.        Review of Systems   Constitutional:  Negative for fatigue and irritability.   HENT:  Positive for rhinorrhea, sinus pressure and sore throat. Negative for ear pain.    Eyes:  Positive for photophobia. Negative for blurred vision and pain.  "  Respiratory:  Negative for shortness of breath.    Cardiovascular:  Negative for chest pain and palpitations.   Gastrointestinal:  Positive for nausea and vomiting. Negative for abdominal pain and anorexia.   Musculoskeletal:  Negative for arthralgias and joint swelling.   Skin:  Negative for rash.   Neurological:  Positive for headaches. Negative for dizziness, numbness and loss of balance.   Psychiatric/Behavioral:  Negative for confusion and suicidal ideas. The patient is nervous/anxious and has insomnia.        Objective   BP (!) 128/92 (BP Location: Left arm)   Pulse 78   Temp 36.4 °C (97.5 °F)   Resp 21   Ht 1.626 m (5' 4\")   Wt 92 kg (202 lb 12.8 oz)   SpO2 98%   BMI 34.81 kg/m²     Physical Exam  Constitutional:       General: She is not in acute distress.     Appearance: Normal appearance.   HENT:      Head: Normocephalic and atraumatic.      Mouth/Throat:      Mouth: Mucous membranes are moist.      Pharynx: Oropharynx is clear. No oropharyngeal exudate or posterior oropharyngeal erythema.   Eyes:      General: No scleral icterus.     Extraocular Movements: Extraocular movements intact.      Pupils: Pupils are equal, round, and reactive to light.   Cardiovascular:      Rate and Rhythm: Normal rate and regular rhythm.      Pulses: Normal pulses.      Heart sounds: No murmur heard.     No friction rub. No gallop.   Pulmonary:      Effort: Pulmonary effort is normal.      Breath sounds: No wheezing, rhonchi or rales.   Skin:     General: Skin is warm.      Coloration: Skin is not jaundiced or pale.   Neurological:      General: No focal deficit present.      Mental Status: She is alert and oriented to person, place, and time.         Assessment/Plan   Problem List Items Addressed This Visit       Chronic allergic rhinitis     Stable, continue current medications and management.           Relevant Medications    loratadine-pseudoephedrine (Claritin-D 12 Hour) 5-120 mg 12 hr tablet    fluticasone " (Flonase) 50 mcg/actuation nasal spray    Generalized anxiety disorder     The risks and benefits of my recommendations, as well as other treatment options were discussed with the patient today.    The side effects of the medications were discussed.  Advised not to take the medication with alcohol .  Exercise regularly and this can give you a sense of well being and help decrease feelings of anxiety.;  Get plenty of sleep. Sleep rests your brain as well as your body, and can improve your general sense of wellbeing as well as your mood.;  Avoid alcohol and drug abuse. It may seem that alcohol or drugs relax you. But in the long run they make anxiety worse and cause more problems.;  Avoid caffeine. Caffeine is found in coffee, tea, soft drinks and chocolate. Caffeine may increase your sense of anxiety because it stimulates your nervous system. Also avoid over-the-counter diet pills, and cough and cold medicines that contain a decongestant.;  Confront the things that have made you anxious in the past. Begin by just picturing yourself confronting these things. By doing this, you can get used to the idea of confronting the things that make you anxious before you actually do it. After you feel more comfortable picturing yourself confronting these things, you can begin to actually face them.;  If you feel yourself getting anxious, practice a relaxation technique or focus on a simple task, such as counting backward from 100 to 0.;  Although feelings of anxiety are scary, they won't hurt you. Label the level of your fear from 0 to 10 and keep track as it goes up and down. Notice that it doesn't stay at a very high level for more than a few seconds. When the fear comes, accept it. Wait and give it time to pass without running away from it.;  Take medications as prescribed.            Relevant Medications    escitalopram (Lexapro) 10 mg tablet    ALPRAZolam (Xanax) 0.5 mg tablet    Other Relevant Orders    Follow Up In  [facility] - PCP - Established    Chronic insomnia     Stable, continue current medications and management.           Relevant Medications    melatonin 5 mg capsule    Other Relevant Orders    Follow Up In [facility] - PCP - Established    Intractable chronic migraine without aura and without status migrainosus     Lie in darkened room and apply cold packs as needed for pain.  Episodic therapy: triptan therapy due to low frequency of pain.  Side effect profile discussed in detail.  Asked to keep headache diary.  Follow up in 3 months or sooner with persistent or worsening symptoms.         Relevant Medications    SUMAtriptan (Imitrex) 100 mg tablet    magnesium oxide (Mag-Ox) 400 mg (241.3 mg magnesium) tablet    Other Relevant Orders    Follow Up In [facility] - PCP - Established    Nausea     We will prescribe Zofran for her to take as needed for nausea.         Relevant Medications    ondansetron (Zofran) 4 mg tablet     OARRS:  Teja Stevens MD on 10/31/2023  4:54 PM  I have personally reviewed the OARRS report for Jennifer South. I have considered the risks of abuse, dependence, addiction and diversion    Is the patient prescribed a combination of a benzodiazepine and opioid?  No    Last Urine Drug Screen Done 12/14/2022  No results found for this or any previous visit (from the past 8760 hour(s)).    Controlled Substance Agreement:  Date of the Last Agreement: 3/21/2023  Reviewed Controlled Substance Agreement including but not limited to the benefits, risks, and alternatives to treatment with a Controlled Substance medication(s).    Benzodiazepines:  What is the patient's goal of therapy? Able to function without being unduly anxious and carry out day-to-day activities without tension and panic attacks    Is this being achieved with current treatment? yes    NITA-7:  Over the last 2 weeks, how often have you been bothered by any of the following problems?  Feeling nervous,  anxious, or on edge: 3  Not being able to stop or control worryin  Worrying too much about different things: 2  Trouble relaxing: 3  Being so restless that it is hard to sit still: 3  Becoming easily annoyed or irritable: 0  Feeling afraid as if something awful might happen: 0  NITA-7 Total Score: 13    Activities of Daily Living:   Is your overall impression that this patient is benefiting (symptom reduction outweighs side effects) from benzodiazepine therapy? Yes     1. Physical Functioning: Better  2. Family Relationship: Better  3. Social Relationship: Better  4. Mood: Better  5. Sleep Patterns: Better  6. Overall Function: Better    Scribe Attestation  By signing my name below, IShady Scribe   attest that this documentation has been prepared under the direction and in the presence of Teja Stevens MD.

## 2023-10-31 NOTE — ASSESSMENT & PLAN NOTE
Lie in darkened room and apply cold packs as needed for pain.  Episodic therapy: triptan therapy due to low frequency of pain.  Side effect profile discussed in detail.  Asked to keep headache diary.  Follow up in 3 months or sooner with persistent or worsening symptoms.

## 2023-10-31 NOTE — ASSESSMENT & PLAN NOTE
The risks and benefits of my recommendations, as well as other treatment options were discussed with the patient today.    The side effects of the medications were discussed.  Advised not to take the medication with alcohol .  Exercise regularly and this can give you a sense of well being and help decrease feelings of anxiety.;  Get plenty of sleep. Sleep rests your brain as well as your body, and can improve your general sense of wellbeing as well as your mood.;  Avoid alcohol and drug abuse. It may seem that alcohol or drugs relax you. But in the long run they make anxiety worse and cause more problems.;  Avoid caffeine. Caffeine is found in coffee, tea, soft drinks and chocolate. Caffeine may increase your sense of anxiety because it stimulates your nervous system. Also avoid over-the-counter diet pills, and cough and cold medicines that contain a decongestant.;  Confront the things that have made you anxious in the past. Begin by just picturing yourself confronting these things. By doing this, you can get used to the idea of confronting the things that make you anxious before you actually do it. After you feel more comfortable picturing yourself confronting these things, you can begin to actually face them.;  If you feel yourself getting anxious, practice a relaxation technique or focus on a simple task, such as counting backward from 100 to 0.;  Although feelings of anxiety are scary, they won't hurt you. Label the level of your fear from 0 to 10 and keep track as it goes up and down. Notice that it doesn't stay at a very high level for more than a few seconds. When the fear comes, accept it. Wait and give it time to pass without running away from it.;  Take medications as prescribed.

## 2023-11-01 LAB
AMPHETAMINES UR QL SCN: ABNORMAL
BARBITURATES UR QL SCN: ABNORMAL
BZE UR QL SCN: ABNORMAL
CANNABINOIDS UR QL SCN: ABNORMAL
CREAT UR-MCNC: 113.9 MG/DL (ref 20–320)
PCP UR QL SCN: ABNORMAL

## 2023-11-01 PROCEDURE — 80354 DRUG SCREENING FENTANYL: CPT

## 2023-11-01 PROCEDURE — 82570 ASSAY OF URINE CREATININE: CPT

## 2023-11-01 PROCEDURE — 80349 CANNABINOIDS NATURAL: CPT

## 2023-11-01 PROCEDURE — 80368 SEDATIVE HYPNOTICS: CPT

## 2023-11-01 PROCEDURE — 80365 DRUG SCREENING OXYCODONE: CPT

## 2023-11-01 PROCEDURE — 80358 DRUG SCREENING METHADONE: CPT

## 2023-11-01 PROCEDURE — 80361 OPIATES 1 OR MORE: CPT

## 2023-11-01 PROCEDURE — 80346 BENZODIAZEPINES1-12: CPT

## 2023-11-01 PROCEDURE — 80373 DRUG SCREENING TRAMADOL: CPT

## 2023-11-03 LAB
1OH-MIDAZOLAM UR CFM-MCNC: <25 NG/ML
6MAM UR CFM-MCNC: <25 NG/ML
7AMINOCLONAZEPAM UR CFM-MCNC: <25 NG/ML
A-OH ALPRAZ UR CFM-MCNC: <25 NG/ML
ALPRAZ UR CFM-MCNC: <25 NG/ML
CHLORDIAZEP UR CFM-MCNC: <25 NG/ML
CLONAZEPAM UR CFM-MCNC: <25 NG/ML
CODEINE UR CFM-MCNC: <50 NG/ML
DIAZEPAM UR CFM-MCNC: <25 NG/ML
EDDP UR CFM-MCNC: <25 NG/ML
FENTANYL UR CFM-MCNC: <2.5 NG/ML
HYDROCODONE CTO UR CFM-MCNC: <25 NG/ML
HYDROMORPHONE UR CFM-MCNC: <25 NG/ML
LORAZEPAM UR CFM-MCNC: <25 NG/ML
METHADONE UR CFM-MCNC: <25 NG/ML
MIDAZOLAM UR CFM-MCNC: <25 NG/ML
MORPHINE UR CFM-MCNC: <50 NG/ML
NORDIAZEPAM UR CFM-MCNC: <25 NG/ML
NORFENTANYL UR CFM-MCNC: <2.5 NG/ML
NORHYDROCODONE UR CFM-MCNC: <25 NG/ML
NOROXYCODONE UR CFM-MCNC: <25 NG/ML
NORTRAMADOL UR-MCNC: >1000 NG/ML
OXAZEPAM UR CFM-MCNC: <25 NG/ML
OXYCODONE UR CFM-MCNC: <25 NG/ML
OXYMORPHONE UR CFM-MCNC: <25 NG/ML
TEMAZEPAM UR CFM-MCNC: <25 NG/ML
TRAMADOL UR CFM-MCNC: >1000 NG/ML
ZOLPIDEM UR CFM-MCNC: <25 NG/ML
ZOLPIDEM UR-MCNC: <25 NG/ML

## 2023-11-09 LAB — CARBOXYTHC UR-MCNC: 22 NG/ML

## 2023-11-17 ENCOUNTER — TELEMEDICINE (OUTPATIENT)
Dept: PRIMARY CARE | Facility: CLINIC | Age: 43
End: 2023-11-17
Payer: COMMERCIAL

## 2023-11-17 VITALS — WEIGHT: 202.8 LBS | HEIGHT: 64 IN | BODY MASS INDEX: 34.62 KG/M2

## 2023-11-17 DIAGNOSIS — F41.1 GENERALIZED ANXIETY DISORDER: Primary | ICD-10-CM

## 2023-11-17 DIAGNOSIS — R89.2 ABNORMAL TOXICOLOGICAL FINDINGS: ICD-10-CM

## 2023-11-17 PROCEDURE — 99213 OFFICE O/P EST LOW 20 MIN: CPT | Performed by: FAMILY MEDICINE

## 2023-11-17 ASSESSMENT — ENCOUNTER SYMPTOMS
RESTLESSNESS: 1
CONFUSION: 0
NUMBNESS: 0
CHILLS: 0
FREQUENCY: 0
IRRITABILITY: 0
COUGH: 0
ABDOMINAL PAIN: 0
COMPULSIONS: 0
HEADACHES: 0
SORE THROAT: 0
DYSURIA: 0
TREMORS: 0
NERVOUS/ANXIOUS: 1
RHINORRHEA: 0
CONSTIPATION: 0
VOMITING: 0
DIARRHEA: 0
PALPITATIONS: 0
WHEEZING: 0
INSOMNIA: 1
DIZZINESS: 0
FEVER: 0
SHORTNESS OF BREATH: 0
HEMATURIA: 0

## 2023-11-17 NOTE — PROGRESS NOTES
"Chief Complaint   Patient presents with    Follow-up     Anxiety and Results of Urine Drug Screen       Subjective   Patient ID: Jennifer South is a 43 y.o. female who presents for Follow-up (Anxiety and Results of Urine Drug Screen).    Anxiety  Presents for follow-up visit. Symptoms include excessive worry, insomnia, nervous/anxious behavior and restlessness. Patient reports no chest pain, compulsions, confusion, dizziness, irritability, palpitations or shortness of breath.       The patient's recent urine tox screen was positive for THC and tramadol.  We discussed this with the patient today and indicates that he has been using CBD oil for his generalized joint pain in order to alleviate some of his symptoms. She was unaware that the CBD may contain contaminants including THC.  She apparently took 1 tramadol tablet from her mom when she had severe headache.  She was not aware that this would show up in the urine.    Review of Systems   Constitutional:  Negative for chills, fever and irritability.   HENT:  Negative for congestion, ear pain, nosebleeds, rhinorrhea and sore throat.    Respiratory:  Negative for cough, shortness of breath and wheezing.    Cardiovascular:  Negative for chest pain, palpitations and leg swelling.   Gastrointestinal:  Negative for abdominal pain, constipation, diarrhea and vomiting.   Genitourinary:  Negative for dysuria, frequency and hematuria.   Neurological:  Negative for dizziness, tremors, numbness and headaches.   Psychiatric/Behavioral:  Negative for confusion. The patient is nervous/anxious and has insomnia.        Objective   Ht 1.626 m (5' 4\")   Wt 92 kg (202 lb 12.8 oz)   BMI 34.81 kg/m²     Assessment/Plan   Problem List Items Addressed This Visit       Abnormal toxicological findings     We discussed the urine test result with the patient. She was informed that a lot of  CBD oil products do contain contaminants including THC unless this is pure CBD.  We did advise the " patient not to use CBD oil that are contaminated with THC as this will be reflected in his urine tox screen.  She agrees and was informed that we would have another urine tox screen randomly in the future.         Generalized anxiety disorder - Primary     Stable, continue current medications and management.  The risks and benefits of my recommendations, as well as other treatment options were discussed with the patient today.    The side effects of the medications were discussed.  Advised not to take the medication with alcohol .  Exercise regularly and this can give you a sense of well being and help decrease feelings of anxiety.;  Get plenty of sleep. Sleep rests your brain as well as your body, and can improve your general sense of wellbeing as well as your mood.;  Avoid alcohol and drug abuse. It may seem that alcohol or drugs relax you. But in the long run they make anxiety worse and cause more problems.;  Avoid caffeine. Caffeine is found in coffee, tea, soft drinks and chocolate. Caffeine may increase your sense of anxiety because it stimulates your nervous system. Also avoid over-the-counter diet pills, and cough and cold medicines that contain a decongestant.;  Confront the things that have made you anxious in the past. Begin by just picturing yourself confronting these things. By doing this, you can get used to the idea of confronting the things that make you anxious before you actually do it. After you feel more comfortable picturing yourself confronting these things, you can begin to actually face them.;  If you feel yourself getting anxious, practice a relaxation technique or focus on a simple task, such as counting backward from 100 to 0.;  Although feelings of anxiety are scary, they won't hurt you. Label the level of your fear from 0 to 10 and keep track as it goes up and down. Notice that it doesn't stay at a very high level for more than a few seconds. When the fear comes, accept it. Wait and give  it time to pass without running away from it.;  Take medications as prescribed.          Scribe Attestation  By signing my name below, I, Ashia Lui   attest that this documentation has been prepared under the direction and in the presence of Teja Stevens MD.

## 2024-01-30 ENCOUNTER — OFFICE VISIT (OUTPATIENT)
Dept: PRIMARY CARE | Facility: CLINIC | Age: 44
End: 2024-01-30
Payer: COMMERCIAL

## 2024-01-30 VITALS
WEIGHT: 206.8 LBS | HEART RATE: 110 BPM | TEMPERATURE: 97.4 F | DIASTOLIC BLOOD PRESSURE: 80 MMHG | BODY MASS INDEX: 35.3 KG/M2 | OXYGEN SATURATION: 99 % | SYSTOLIC BLOOD PRESSURE: 130 MMHG | HEIGHT: 64 IN | RESPIRATION RATE: 18 BRPM

## 2024-01-30 DIAGNOSIS — Z79.899 MEDICATION MANAGEMENT: ICD-10-CM

## 2024-01-30 DIAGNOSIS — F41.1 GENERALIZED ANXIETY DISORDER: ICD-10-CM

## 2024-01-30 DIAGNOSIS — E66.01 CLASS 2 SEVERE OBESITY DUE TO EXCESS CALORIES WITH SERIOUS COMORBIDITY AND BODY MASS INDEX (BMI) OF 35.0 TO 35.9 IN ADULT (MULTI): ICD-10-CM

## 2024-01-30 DIAGNOSIS — F51.04 CHRONIC INSOMNIA: ICD-10-CM

## 2024-01-30 DIAGNOSIS — Z12.31 ENCOUNTER FOR SCREENING MAMMOGRAM FOR BREAST CANCER: ICD-10-CM

## 2024-01-30 DIAGNOSIS — G43.719 INTRACTABLE CHRONIC MIGRAINE WITHOUT AURA AND WITHOUT STATUS MIGRAINOSUS: ICD-10-CM

## 2024-01-30 DIAGNOSIS — Z00.00 HEALTHCARE MAINTENANCE: Primary | ICD-10-CM

## 2024-01-30 LAB
POC AMPHETAMINE: NEGATIVE NG/ML
POC BARBITURATES: NEGATIVE NG/ML
POC BENZODIAZEPINES: NEGATIVE NG/ML
POC BUPRENORPHINE URINE: NEGATIVE NG/ML
POC COCAINE: NEGATIVE NG/ML
POC MDMA (NG/ML) IN URINE: NEGATIVE NG/ML
POC METHADONE MANUALLY ENTERED: NEGATIVE NG/ML
POC METHAMPHETAMINE: NEGATIVE NG/ML
POC MORPHINE URINE: NEGATIVE NG/ML
POC OPIATES: NEGATIVE NG/ML
POC OXYCODONE: NEGATIVE NG/ML
POC PHENCYCLIDINE (PCP): NEGATIVE NG/ML
POC THC: NEGATIVE NG/ML
POC TICYCLIC ANTIDEPRESSANTS (TCA): NEGATIVE NG/ML

## 2024-01-30 PROCEDURE — 80305 DRUG TEST PRSMV DIR OPT OBS: CPT | Performed by: FAMILY MEDICINE

## 2024-01-30 PROCEDURE — 99396 PREV VISIT EST AGE 40-64: CPT | Performed by: FAMILY MEDICINE

## 2024-01-30 PROCEDURE — 1036F TOBACCO NON-USER: CPT | Performed by: FAMILY MEDICINE

## 2024-01-30 PROCEDURE — 3008F BODY MASS INDEX DOCD: CPT | Performed by: FAMILY MEDICINE

## 2024-01-30 RX ORDER — MELATONIN 5 MG
5 CAPSULE ORAL NIGHTLY
Qty: 30 CAPSULE | Refills: 2 | Status: SHIPPED | OUTPATIENT
Start: 2024-01-30 | End: 2024-05-10 | Stop reason: SDUPTHER

## 2024-01-30 RX ORDER — ALPRAZOLAM 0.5 MG/1
.25-.5 TABLET ORAL 2 TIMES DAILY PRN
Qty: 60 TABLET | Refills: 0 | Status: SHIPPED | OUTPATIENT
Start: 2024-01-30 | End: 2024-05-10 | Stop reason: SDUPTHER

## 2024-01-30 RX ORDER — LANOLIN ALCOHOL/MO/W.PET/CERES
400 CREAM (GRAM) TOPICAL DAILY
Qty: 30 TABLET | Refills: 3 | Status: SHIPPED | OUTPATIENT
Start: 2024-01-30 | End: 2024-05-10 | Stop reason: SDUPTHER

## 2024-01-30 ASSESSMENT — ENCOUNTER SYMPTOMS
INSOMNIA: 1
DEPRESSED MOOD: 0
RESTLESSNESS: 1
IRRITABILITY: 0
NERVOUS/ANXIOUS: 1

## 2024-01-30 ASSESSMENT — ANXIETY QUESTIONNAIRES
GAD7 TOTAL SCORE: 4
3. WORRYING TOO MUCH ABOUT DIFFERENT THINGS: SEVERAL DAYS
2. NOT BEING ABLE TO STOP OR CONTROL WORRYING: SEVERAL DAYS
7. FEELING AFRAID AS IF SOMETHING AWFUL MIGHT HAPPEN: NOT AT ALL
5. BEING SO RESTLESS THAT IT IS HARD TO SIT STILL: NOT AT ALL
6. BECOMING EASILY ANNOYED OR IRRITABLE: NOT AT ALL
1. FEELING NERVOUS, ANXIOUS, OR ON EDGE: SEVERAL DAYS
4. TROUBLE RELAXING: SEVERAL DAYS
IF YOU CHECKED OFF ANY PROBLEMS ON THIS QUESTIONNAIRE, HOW DIFFICULT HAVE THESE PROBLEMS MADE IT FOR YOU TO DO YOUR WORK, TAKE CARE OF THINGS AT HOME, OR GET ALONG WITH OTHER PEOPLE: NOT DIFFICULT AT ALL

## 2024-01-30 NOTE — PROGRESS NOTES
"Subjective   Patient ID: Jennifer South is a 43 y.o. female who presents for Annual Exam and Follow-up (Anxiety).    Patient presents today for annual physical exam.    Anxiety  Presents for follow-up visit. Symptoms include excessive worry, insomnia, muscle tension, nervous/anxious behavior and restlessness. Patient reports no chest pain, compulsions, depressed mood, dizziness, irritability, malaise, obsessions, palpitations, panic, shortness of breath or suicidal ideas. The quality of sleep is fair. Nighttime awakenings: several.     Compliance with medications is %.     Review of Systems   Constitutional:  Negative for chills, fever and irritability.   HENT:  Negative for congestion, ear pain, nosebleeds, rhinorrhea and sore throat.    Respiratory:  Negative for cough, shortness of breath and wheezing.    Cardiovascular:  Negative for chest pain, palpitations and leg swelling.   Gastrointestinal:  Negative for abdominal pain, constipation, diarrhea and vomiting.   Genitourinary:  Negative for dysuria, frequency and hematuria.   Neurological:  Negative for dizziness, tremors, numbness and headaches.   Psychiatric/Behavioral:  Negative for suicidal ideas. The patient is nervous/anxious and has insomnia.      Objective   /80 (BP Location: Left arm)   Pulse 110   Temp 36.3 °C (97.4 °F)   Resp 18   Ht 1.626 m (5' 4\")   Wt 93.8 kg (206 lb 12.8 oz)   SpO2 99%   BMI 35.50 kg/m²     Physical Exam  Constitutional:       General: She is not in acute distress.     Appearance: Normal appearance.   HENT:      Head: Normocephalic and atraumatic.      Mouth/Throat:      Mouth: Mucous membranes are moist.      Pharynx: Oropharynx is clear. No oropharyngeal exudate or posterior oropharyngeal erythema.   Eyes:      General: No scleral icterus.     Extraocular Movements: Extraocular movements intact.      Pupils: Pupils are equal, round, and reactive to light.   Cardiovascular:      Rate and Rhythm: Normal rate " and regular rhythm.      Pulses: Normal pulses.      Heart sounds: No murmur heard.     No friction rub. No gallop.   Pulmonary:      Effort: Pulmonary effort is normal.      Breath sounds: No wheezing, rhonchi or rales.   Skin:     General: Skin is warm.      Coloration: Skin is not jaundiced or pale.      Findings: No erythema or rash.   Neurological:      General: No focal deficit present.      Mental Status: She is alert and oriented to person, place, and time.      Cranial Nerves: No cranial nerve deficit.      Sensory: No sensory deficit.      Coordination: Coordination normal.      Gait: Gait normal.         Assessment/Plan   Problem List Items Addressed This Visit       Chronic insomnia     Stable, continue current medications and management.         Relevant Medications    melatonin 5 mg capsule    Other Relevant Orders    Follow Up In [facility] - PCP - Established    Class 2 severe obesity due to excess calories with serious comorbidity and body mass index (BMI) of 35.0 to 35.9 in adult (CMS/[facility])     Continue decrease calorie diet and not more than 1500 calorie per day diet and low-fat diet.  Continue with regular exercise program.  We advised exercise at least 5 days a week for at least 45 minutes and also a minimum of 10,000 steps a day.  The detrimental effects of obesity on health were discussed.         Generalized anxiety disorder     Well-controlled, continue current medications and management.         Relevant Medications    ALPRAZolam (Xanax) 0.5 mg tablet    Other Relevant Orders    POCT waived urine drug screen manually resulted (Completed)    Follow Up In [facility] - PCP - Established    Healthcare maintenance - Primary     Recommend low-cholesterol diet, low-fat diet and low-salt diet.  The need for lifelong dietary compliance in order to reduce cardiac risk is recommended.  We will also recommend regular exercise program to improve lipid balance and overall  health.  Recommend decreasing fat and cholesterol in diet, increasing aerobic exercise with a goal of 4 or more days per week         Relevant Orders    CBC and Auto Differential    Comprehensive Metabolic Panel    Lipid Panel    Intractable chronic migraine without aura and without status migrainosus     Well-controlled, continue current medications and management.         Relevant Medications    magnesium oxide (Mag-Ox) 400 mg (241.3 mg magnesium) tablet    Other Relevant Orders    Follow Up In [facility] - PCP - Established     Other Visit Diagnoses       Medication management        Relevant Orders    POCT waived urine drug screen manually resulted (Completed)    Encounter for screening mammogram for breast cancer        Relevant Orders    BI mammo bilateral screening tomosynthesis          OAR[facility]:  Teja Stevens MD on 1/30/2024  4:38 PM  I have personally reviewed the OAR[facility] report for Jennifer ARCELIA South. I have considered the risks of abuse, dependence, addiction and diversion    Is the patient prescribed a combination of a benzodiazepine and opioid?  No    Last Urine Drug Screen / ordered today: Yes  Recent Results (from the past 8760 hour(s))   POCT waived urine drug screen manually resulted    Collection Time: 01/30/24  4:56 PM   Result Value Ref Range    POC THC Negative Negative, Not applicable ng/mL    POC Cocaine Negative Negative, Not applicable ng/mL    POC Opiates Negative Negative, Not applicable ng/mL    POC Amphetamine Negative Negative, Not applicable ng/mL    POC Phencyclidine (PCP) Negative Negative, Not applicable ng/mL    POC Barbiturates Negative Negative, Not applicable ng/mL    POC Benzodiazepines Negative Negative, Not applicable ng/mL    POC Methamphetamine Negative Negative, Not applicable ng/mL    POC METHADONE MANUALLY ENTERED Negative Negative, Not applicable ng/mL    POC Ticyclic Antidepressants (TCA) Negative Negative, Not applicable ng/mL    POC Oxycodone Negative  Negative, Not applicable ng/mL    POC MDMA URINE Negative Negative, Not applicable ng/mL    POC Morphine Urine Negative Negative, Not applicable ng/mL    POC Burprenorphine Urine Negative Negative, Not applicable ng/mL   Confirmation Opiate/Opioid/Benzo Prescription Compliance    Collection Time: 11/01/23 12:30 PM   Result Value Ref Range    Clonazepam <25 <25 ng/mL    7-Aminoclonazepam <25 <25 ng/mL    Alprazolam <25 <25 ng/mL    Alpha-Hydroxyalprazolam <25 <25 ng/mL    Midazolam <25 <25 ng/mL    Alpha-Hydroxymidazolam <25 <25 ng/mL    Chlordiazepoxide <25 <25 ng/mL    Diazepam <25 <25 ng/mL    Nordiazepam <25 <25 ng/mL    Temazepam <25 <25 ng/mL    Oxazepam <25 <25 ng/mL    Lorazepam <25 <25 ng/mL    Methadone <25 <25 ng/mL    EDDP <25 <25 ng/mL    6-Acetylmorphine <25 <25 ng/mL    Codeine <50 <50 ng/mL    Hydrocodone <25 <25 ng/mL    Hydromorphone <25 <25 ng/mL    Morphine  <50 <50 ng/mL    Norhydrocodone <25 <25 ng/mL    Noroxycodone <25 <25 ng/mL    Oxycodone <25 <25 ng/mL    Oxymorphone <25 <25 ng/mL    Fentanyl <2.5 <2.5 ng/mL    Norfentanyl <2.5 <2.5 ng/mL    Tramadol >1,000 (H) <50 ng/mL    O-Desmethyltramadol >1,000 (H) <50 ng/mL    Zolpidem <25 <25 ng/mL    Zolpidem Metabolite (ZCA) <25 <25 ng/mL   Screen Opiate/Opioid/Benzo Prescription Compliance    Collection Time: 11/01/23 12:30 PM   Result Value Ref Range    Creatinine, Urine Random 113.9 20.0 - 320.0 mg/dL    Amphetamine Screen, Urine Presumptive Negative Presumptive Negative    Barbiturate Screen, Urine Presumptive Negative Presumptive Negative    Cannabinoid Screen, Urine Presumptive Positive (A) Presumptive Negative    Cocaine Metabolite Screen, Urine Presumptive Negative Presumptive Negative    PCP Screen, Urine Presumptive Negative Presumptive Negative     Controlled Substance Agreement:  Date of the Last Agreement: 3/21/2023  Reviewed Controlled Substance Agreement including but not limited to the benefits, risks, and alternatives to treatment  with a Controlled Substance medication(s).    Benzodiazepines:  What is the patient's goal of therapy? Able to function without being unduly anxious and carry out day-to-day activities without tension and panic attacks    Is this being achieved with current treatment? yes    NITA-7:  Over the last 2 weeks, how often have you been bothered by any of the following problems?  Feeling nervous, anxious, or on edge: 1  Not being able to stop or control worryin  Worrying too much about different things: 1  Trouble relaxin  Being so restless that it is hard to sit still: 0  Becoming easily annoyed or irritable: 0  Feeling afraid as if something awful might happen: 0  NITA-7 Total Score: 4    Activities of Daily Living:   Is your overall impression that this patient is benefiting (symptom reduction outweighs side effects) from benzodiazepine therapy? Yes     1. Physical Functioning: Better  2. Family Relationship: Better  3. Social Relationship: Better  4. Mood: Better  5. Sleep Patterns: Better  6. Overall Function: Better    Scribe Attestation  By signing my name below, IShady Scribe   attest that this documentation has been prepared under the direction and in the presence of Teja Stevens MD.

## 2024-01-31 PROBLEM — R89.2 ABNORMAL TOXICOLOGICAL FINDINGS: Status: RESOLVED | Noted: 2023-11-17 | Resolved: 2024-01-31

## 2024-01-31 ASSESSMENT — ENCOUNTER SYMPTOMS
COMPULSIONS: 0
PANIC: 0
RHINORRHEA: 0
HEMATURIA: 0
NUMBNESS: 0
MUSCLE TENSION: 1
SORE THROAT: 0
TREMORS: 0
DYSURIA: 0
PALPITATIONS: 0
CHILLS: 0
CONSTIPATION: 0
DIZZINESS: 0
HEADACHES: 0
SHORTNESS OF BREATH: 0
COUGH: 0
THOUGHT CONTENT - OBSESSIONS: 0
WHEEZING: 0
FREQUENCY: 0
MALAISE: 0
FEVER: 0
DIARRHEA: 0
VOMITING: 0
ABDOMINAL PAIN: 0

## 2024-04-30 ENCOUNTER — APPOINTMENT (OUTPATIENT)
Dept: PRIMARY CARE | Facility: CLINIC | Age: 44
End: 2024-04-30
Payer: COMMERCIAL

## 2024-05-01 ENCOUNTER — APPOINTMENT (OUTPATIENT)
Dept: PRIMARY CARE | Facility: CLINIC | Age: 44
End: 2024-05-01
Payer: COMMERCIAL

## 2024-05-10 ENCOUNTER — TELEMEDICINE (OUTPATIENT)
Dept: PRIMARY CARE | Facility: CLINIC | Age: 44
End: 2024-05-10
Payer: COMMERCIAL

## 2024-05-10 VITALS — BODY MASS INDEX: 35.17 KG/M2 | WEIGHT: 206 LBS | HEIGHT: 64 IN

## 2024-05-10 DIAGNOSIS — E66.01 CLASS 2 SEVERE OBESITY DUE TO EXCESS CALORIES WITH SERIOUS COMORBIDITY AND BODY MASS INDEX (BMI) OF 35.0 TO 35.9 IN ADULT (MULTI): ICD-10-CM

## 2024-05-10 DIAGNOSIS — F41.1 GENERALIZED ANXIETY DISORDER: ICD-10-CM

## 2024-05-10 DIAGNOSIS — J30.9 CHRONIC ALLERGIC RHINITIS: ICD-10-CM

## 2024-05-10 DIAGNOSIS — F51.04 CHRONIC INSOMNIA: ICD-10-CM

## 2024-05-10 DIAGNOSIS — G43.719 INTRACTABLE CHRONIC MIGRAINE WITHOUT AURA AND WITHOUT STATUS MIGRAINOSUS: ICD-10-CM

## 2024-05-10 PROCEDURE — 99213 OFFICE O/P EST LOW 20 MIN: CPT | Performed by: FAMILY MEDICINE

## 2024-05-10 PROCEDURE — 3008F BODY MASS INDEX DOCD: CPT | Performed by: FAMILY MEDICINE

## 2024-05-10 RX ORDER — MELATONIN 5 MG
5 CAPSULE ORAL NIGHTLY
Qty: 30 CAPSULE | Refills: 2 | Status: SHIPPED | OUTPATIENT
Start: 2024-05-10

## 2024-05-10 RX ORDER — FLUTICASONE PROPIONATE 50 MCG
2 SPRAY, SUSPENSION (ML) NASAL DAILY
Qty: 48 G | Refills: 1 | Status: SHIPPED | OUTPATIENT
Start: 2024-05-10

## 2024-05-10 RX ORDER — LANOLIN ALCOHOL/MO/W.PET/CERES
400 CREAM (GRAM) TOPICAL DAILY
Qty: 30 TABLET | Refills: 3 | Status: SHIPPED | OUTPATIENT
Start: 2024-05-10

## 2024-05-10 RX ORDER — LORATADINE AND PSEUDOEPHEDRINE SULFATE 5; 120 MG/1; MG/1
1 TABLET, EXTENDED RELEASE ORAL 2 TIMES DAILY
Qty: 60 TABLET | Refills: 2 | Status: SHIPPED | OUTPATIENT
Start: 2024-05-10

## 2024-05-10 RX ORDER — ALPRAZOLAM 0.5 MG/1
.25-.5 TABLET ORAL 2 TIMES DAILY PRN
Qty: 60 TABLET | Refills: 0 | Status: SHIPPED | OUTPATIENT
Start: 2024-05-10

## 2024-05-10 RX ORDER — LORATADINE AND PSEUDOEPHEDRINE SULFATE 5; 120 MG/1; MG/1
1 TABLET, EXTENDED RELEASE ORAL 2 TIMES DAILY
Qty: 60 TABLET | Refills: 2 | OUTPATIENT
Start: 2024-05-10

## 2024-05-10 RX ORDER — ESCITALOPRAM OXALATE 10 MG/1
10 TABLET ORAL DAILY
Qty: 30 TABLET | Refills: 5 | Status: SHIPPED | OUTPATIENT
Start: 2024-05-10

## 2024-05-10 ASSESSMENT — PATIENT HEALTH QUESTIONNAIRE - PHQ9
1. LITTLE INTEREST OR PLEASURE IN DOING THINGS: NOT AT ALL
SUM OF ALL RESPONSES TO PHQ9 QUESTIONS 1 AND 2: 0
2. FEELING DOWN, DEPRESSED OR HOPELESS: NOT AT ALL

## 2024-05-10 ASSESSMENT — ENCOUNTER SYMPTOMS
DEPRESSED MOOD: 0
NUMBNESS: 0
WEAKNESS: 0
INSOMNIA: 1
LOSS OF BALANCE: 0
RESTLESSNESS: 1
IRRITABILITY: 1
NERVOUS/ANXIOUS: 0
DECREASED CONCENTRATION: 0
DIZZINESS: 0

## 2024-05-10 ASSESSMENT — ANXIETY QUESTIONNAIRES
GAD7 TOTAL SCORE: 4
7. FEELING AFRAID AS IF SOMETHING AWFUL MIGHT HAPPEN: NOT AT ALL
3. WORRYING TOO MUCH ABOUT DIFFERENT THINGS: SEVERAL DAYS
4. TROUBLE RELAXING: SEVERAL DAYS
IF YOU CHECKED OFF ANY PROBLEMS ON THIS QUESTIONNAIRE, HOW DIFFICULT HAVE THESE PROBLEMS MADE IT FOR YOU TO DO YOUR WORK, TAKE CARE OF THINGS AT HOME, OR GET ALONG WITH OTHER PEOPLE: SOMEWHAT DIFFICULT
1. FEELING NERVOUS, ANXIOUS, OR ON EDGE: SEVERAL DAYS
5. BEING SO RESTLESS THAT IT IS HARD TO SIT STILL: NOT AT ALL
2. NOT BEING ABLE TO STOP OR CONTROL WORRYING: NOT AT ALL
6. BECOMING EASILY ANNOYED OR IRRITABLE: SEVERAL DAYS

## 2024-05-10 NOTE — PATIENT INSTRUCTIONS
BMI was above normal measurement. Current weight: 93.4 kg (206 lb)  Weight change since last visit (-) denotes wt loss -0.8 lbs   Weight loss needed to achieve BMI 25: 60.7 Lbs  Weight loss needed to achieve BMI 30: 31.6 Lbs  Advised to Increase physical activity.

## 2024-05-10 NOTE — PROGRESS NOTES
"Subjective   Patient ID: Jennifer South is a 44 y.o. female who presents for Follow-up (Migraines, Anxiety, and Chronic Insomnia).    Anxiety  Presents for follow-up visit. Symptoms include insomnia, irritability and restlessness. Patient reports no chest pain, decreased concentration, depressed mood, dizziness, excessive worry, nervous/anxious behavior, palpitations, shortness of breath or suicidal ideas. The quality of sleep is fair. Nighttime awakenings: one to two.     Compliance with medications is %.   Migraine   This is a chronic problem. The current episode started more than 1 year ago. The problem has been unchanged. The pain quality is similar to prior headaches. Associated symptoms include insomnia. Pertinent negatives include no abdominal pain, coughing, dizziness, ear pain, fever, loss of balance, numbness, rhinorrhea, sore throat, vomiting or weakness.      Review of Systems   Constitutional:  Positive for irritability. Negative for chills and fever.   HENT:  Negative for congestion, ear pain, nosebleeds, rhinorrhea and sore throat.    Respiratory:  Negative for cough, shortness of breath and wheezing.    Cardiovascular:  Negative for chest pain, palpitations and leg swelling.   Gastrointestinal:  Negative for abdominal pain, constipation, diarrhea and vomiting.   Genitourinary:  Negative for dysuria, frequency and hematuria.   Neurological:  Negative for dizziness, tremors, weakness, numbness, headaches and loss of balance.   Psychiatric/Behavioral:  Negative for decreased concentration and suicidal ideas. The patient has insomnia. The patient is not nervous/anxious.      Objective   Ht 1.626 m (5' 4\")   Wt 93.4 kg (206 lb)   BMI 35.36 kg/m²     Assessment/Plan   Problem List Items Addressed This Visit       Chronic allergic rhinitis     Well-controlled, continue current medications and management.         Relevant Medications    loratadine-pseudoephedrine (Claritin-D 12 Hour) 5-120 mg 12 hr " tablet    fluticasone (Flonase) 50 mcg/actuation nasal spray    Chronic insomnia     Stable, continue current medications and management.         Relevant Medications    melatonin 5 mg capsule    Other Relevant Orders    Follow Up In [facility] - PCP - Established    Class 2 severe obesity due to excess calories with serious comorbidity and body mass index (BMI) of 35.0 to 35.9 in adult (Multi)     Continue decrease calorie diet and not more than 1500 calorie per day diet and low-fat diet.  Continue with regular exercise program.  We advised exercise at least 5 days a week for at least 45 minutes and also a minimum of 10,000 steps a day.  The detrimental effects of obesity on health were discussed.         Generalized anxiety disorder     Stable, continue current medications and management.  The risks and benefits of my recommendations, as well as other treatment options were discussed with the patient today.    The side effects of the medications were discussed.  Advised not to take the medication with alcohol .  Exercise regularly and this can give you a sense of well being and help decrease feelings of anxiety.;  Get plenty of sleep. Sleep rests your brain as well as your body, and can improve your general sense of wellbeing as well as your mood.;  Avoid alcohol and drug abuse. It may seem that alcohol or drugs relax you. But in the long run they make anxiety worse and cause more problems.;  Avoid caffeine. Caffeine is found in coffee, tea, soft drinks and chocolate. Caffeine may increase your sense of anxiety because it stimulates your nervous system. Also avoid over-the-counter diet pills, and cough and cold medicines that contain a decongestant.;  Confront the things that have made you anxious in the past. Begin by just picturing yourself confronting these things. By doing this, you can get used to the idea of confronting the things that make you anxious before you actually do it. After you feel  more comfortable picturing yourself confronting these things, you can begin to actually face them.;  If you feel yourself getting anxious, practice a relaxation technique or focus on a simple task, such as counting backward from 100 to 0.;  Although feelings of anxiety are scary, they won't hurt you. Label the level of your fear from 0 to 10 and keep track as it goes up and down. Notice that it doesn't stay at a very high level for more than a few seconds. When the fear comes, accept it. Wait and give it time to pass without running away from it.;  Take medications as prescribed.         Relevant Medications    escitalopram (Lexapro) 10 mg tablet    Other Relevant Orders    Follow Up In [facility] - PCP - Established    Intractable chronic migraine without aura and without status migrainosus     Well-controlled, continue current medications and management.         Relevant Medications    magnesium oxide (Mag-Ox) 400 mg (241.3 mg magnesium) tablet    Other Relevant Orders    Follow Up In [facility] - PCP - Established     OARRS:  Teja Stevens MD on 5/10/2024  1:29 PM  I have personally reviewed the OARRS report for Jennifer South. I have considered the risks of abuse, dependence, addiction and diversion    Is the patient prescribed a combination of a benzodiazepine and opioid?  No    Last Urine Drug Screen / ordered today: No  Recent Results (from the past 8760 hour(s))   POCT waived urine drug screen manually resulted    Collection Time: 01/30/24  4:56 PM   Result Value Ref Range    POC THC Negative Negative, Not applicable ng/mL    POC Cocaine Negative Negative, Not applicable ng/mL    POC Opiates Negative Negative, Not applicable ng/mL    POC Amphetamine Negative Negative, Not applicable ng/mL    POC Phencyclidine (PCP) Negative Negative, Not applicable ng/mL    POC Barbiturates Negative Negative, Not applicable ng/mL    POC Benzodiazepines Negative Negative, Not applicable ng/mL    POC  Methamphetamine Negative Negative, Not applicable ng/mL    POC METHADONE MANUALLY ENTERED Negative Negative, Not applicable ng/mL    POC Ticyclic Antidepressants (TCA) Negative Negative, Not applicable ng/mL    POC Oxycodone Negative Negative, Not applicable ng/mL    POC MDMA URINE Negative Negative, Not applicable ng/mL    POC Morphine Urine Negative Negative, Not applicable ng/mL    POC Burprenorphine Urine Negative Negative, Not applicable ng/mL   Confirmation Opiate/Opioid/Benzo Prescription Compliance    Collection Time: 11/01/23 12:30 PM   Result Value Ref Range    Clonazepam <25 <25 ng/mL    7-Aminoclonazepam <25 <25 ng/mL    Alprazolam <25 <25 ng/mL    Alpha-Hydroxyalprazolam <25 <25 ng/mL    Midazolam <25 <25 ng/mL    Alpha-Hydroxymidazolam <25 <25 ng/mL    Chlordiazepoxide <25 <25 ng/mL    Diazepam <25 <25 ng/mL    Nordiazepam <25 <25 ng/mL    Temazepam <25 <25 ng/mL    Oxazepam <25 <25 ng/mL    Lorazepam <25 <25 ng/mL    Methadone <25 <25 ng/mL    EDDP <25 <25 ng/mL    6-Acetylmorphine <25 <25 ng/mL    Codeine <50 <50 ng/mL    Hydrocodone <25 <25 ng/mL    Hydromorphone <25 <25 ng/mL    Morphine  <50 <50 ng/mL    Norhydrocodone <25 <25 ng/mL    Noroxycodone <25 <25 ng/mL    Oxycodone <25 <25 ng/mL    Oxymorphone <25 <25 ng/mL    Fentanyl <2.5 <2.5 ng/mL    Norfentanyl <2.5 <2.5 ng/mL    Tramadol >1,000 (H) <50 ng/mL    O-Desmethyltramadol >1,000 (H) <50 ng/mL    Zolpidem <25 <25 ng/mL    Zolpidem Metabolite (ZCA) <25 <25 ng/mL   Screen Opiate/Opioid/Benzo Prescription Compliance    Collection Time: 11/01/23 12:30 PM   Result Value Ref Range    Creatinine, Urine Random 113.9 20.0 - 320.0 mg/dL    Amphetamine Screen, Urine Presumptive Negative Presumptive Negative    Barbiturate Screen, Urine Presumptive Negative Presumptive Negative    Cannabinoid Screen, Urine Presumptive Positive (A) Presumptive Negative    Cocaine Metabolite Screen, Urine Presumptive Negative Presumptive Negative    PCP Screen, Urine  Presumptive Negative Presumptive Negative     Controlled Substance Agreement:  Date of the Last Agreement: 5/10/2024  Reviewed Controlled Substance Agreement including but not limited to the benefits, risks, and alternatives to treatment with a Controlled Substance medication(s).    Benzodiazepines:  What is the patient's goal of therapy? Able to function without being unduly anxious and carry out day-to-day activities without tension and panic attacks    Is this being achieved with current treatment? yes    NITA-7:  Over the last 2 weeks, how often have you been bothered by any of the following problems?  Feeling nervous, anxious, or on edge: 1  Not being able to stop or control worryin  Worrying too much about different things: 1  Trouble relaxin  Being so restless that it is hard to sit still: 0  Becoming easily annoyed or irritable: 1  Feeling afraid as if something awful might happen: 0  NITA-7 Total Score: 4    Activities of Daily Living:   Is your overall impression that this patient is benefiting (symptom reduction outweighs side effects) from benzodiazepine therapy? Yes     1. Physical Functioning: Better  2. Family Relationship: Better  3. Social Relationship: Better  4. Mood: Better  5. Sleep Patterns: Better  6. Overall Function: Better    Scribe Attestation  By signing my name below, IShady Scribe   attest that this documentation has been prepared under the direction and in the presence of Teja Stevens MD.

## 2024-05-11 ASSESSMENT — ENCOUNTER SYMPTOMS
COUGH: 0
PALPITATIONS: 0
SHORTNESS OF BREATH: 0
HEMATURIA: 0
ABDOMINAL PAIN: 0
CHILLS: 0
CONSTIPATION: 0
TREMORS: 0
SORE THROAT: 0
DYSURIA: 0
HEADACHES: 0
VOMITING: 0
FEVER: 0
RHINORRHEA: 0
FREQUENCY: 0
WHEEZING: 0
DIARRHEA: 0

## 2024-07-23 ENCOUNTER — APPOINTMENT (OUTPATIENT)
Dept: RADIOLOGY | Facility: HOSPITAL | Age: 44
End: 2024-07-23
Payer: COMMERCIAL

## 2024-07-29 ENCOUNTER — TELEPHONE (OUTPATIENT)
Dept: PRIMARY CARE | Facility: CLINIC | Age: 44
End: 2024-07-29
Payer: COMMERCIAL

## 2024-07-29 NOTE — TELEPHONE ENCOUNTER
[facility] TO RESCHEDULE 3 months APPOINTMENT THAT PATIENT CANCELLED FOR 8/5. IF/WHEN PATIENT RETURNS CALL, PLEASE SCHEDULE IN NEXT AVAILABLE OPENING THAT PROVIDER HAS THAT IS CONVENIENT FOR PATIENT.

## 2024-08-05 ENCOUNTER — APPOINTMENT (OUTPATIENT)
Dept: PRIMARY CARE | Facility: CLINIC | Age: 44
End: 2024-08-05
Payer: COMMERCIAL